# Patient Record
Sex: MALE | Race: WHITE | NOT HISPANIC OR LATINO | Employment: OTHER | URBAN - METROPOLITAN AREA
[De-identification: names, ages, dates, MRNs, and addresses within clinical notes are randomized per-mention and may not be internally consistent; named-entity substitution may affect disease eponyms.]

---

## 2017-01-16 ENCOUNTER — TRANSCRIBE ORDERS (OUTPATIENT)
Dept: ADMINISTRATIVE | Facility: HOSPITAL | Age: 82
End: 2017-01-16

## 2017-01-16 DIAGNOSIS — I73.9 PVD (PERIPHERAL VASCULAR DISEASE) (HCC): Primary | ICD-10-CM

## 2017-01-17 ENCOUNTER — HOSPITAL ENCOUNTER (OUTPATIENT)
Dept: CARDIOLOGY | Facility: HOSPITAL | Age: 82
Discharge: HOME | End: 2017-01-17
Admitting: PODIATRIST

## 2017-01-17 DIAGNOSIS — I73.9 PVD (PERIPHERAL VASCULAR DISEASE) (HCC): ICD-10-CM

## 2017-01-17 LAB
BH CV LOWER ARTERIAL LEFT GREAT TOE SYS MAX: 86 MMHG
BH CV LOWER ARTERIAL LEFT TBI RATIO: 0.61
BH CV LOWER ARTERIAL RIGHT ABI RATIO: 1.09
BH CV LOWER ARTERIAL RIGHT GREAT TOE SYS MAX: 85 MMHG
BH CV LOWER ARTERIAL RIGHT POST TIBIAL SYS MAX: 153 MMHG
BH CV LOWER ARTERIAL RIGHT TBI RATIO: 0.61
UPPER ARTERIAL LEFT ARM BRACHIAL SYS MAX: 140 MMHG
UPPER ARTERIAL RIGHT ARM BRACHIAL SYS MAX: 138 MMHG

## 2017-01-17 PROCEDURE — 93922 UPR/L XTREMITY ART 2 LEVELS: CPT

## 2017-02-16 ENCOUNTER — TRANSCRIBE ORDERS (OUTPATIENT)
Dept: PHYSICAL THERAPY | Facility: HOSPITAL | Age: 82
End: 2017-02-16

## 2017-02-16 DIAGNOSIS — M67.911 BILATERAL ROTATOR CUFF DYSFUNCTION: Primary | ICD-10-CM

## 2017-02-16 DIAGNOSIS — M67.912 BILATERAL ROTATOR CUFF DYSFUNCTION: Primary | ICD-10-CM

## 2017-03-16 ENCOUNTER — APPOINTMENT (OUTPATIENT)
Dept: PHYSICAL THERAPY | Facility: HOSPITAL | Age: 82
End: 2017-03-16
Attending: ORTHOPAEDIC SURGERY

## 2017-03-23 ENCOUNTER — APPOINTMENT (OUTPATIENT)
Dept: PHYSICAL THERAPY | Facility: HOSPITAL | Age: 82
End: 2017-03-23

## 2017-03-30 ENCOUNTER — APPOINTMENT (OUTPATIENT)
Dept: PHYSICAL THERAPY | Facility: HOSPITAL | Age: 82
End: 2017-03-30

## 2017-04-06 ENCOUNTER — APPOINTMENT (OUTPATIENT)
Dept: PHYSICAL THERAPY | Facility: HOSPITAL | Age: 82
End: 2017-04-06

## 2017-04-13 ENCOUNTER — APPOINTMENT (OUTPATIENT)
Dept: PHYSICAL THERAPY | Facility: HOSPITAL | Age: 82
End: 2017-04-13

## 2017-04-20 ENCOUNTER — APPOINTMENT (OUTPATIENT)
Dept: PHYSICAL THERAPY | Facility: HOSPITAL | Age: 82
End: 2017-04-20

## 2017-07-30 ENCOUNTER — HOSPITAL ENCOUNTER (EMERGENCY)
Facility: HOSPITAL | Age: 82
Discharge: HOME | End: 2017-07-30
Attending: EMERGENCY MEDICINE | Admitting: EMERGENCY MEDICINE

## 2017-07-30 ENCOUNTER — APPOINTMENT (OUTPATIENT)
Dept: GENERAL RADIOLOGY | Facility: HOSPITAL | Age: 82
End: 2017-07-30

## 2017-07-30 ENCOUNTER — APPOINTMENT (OUTPATIENT)
Dept: CT IMAGING | Facility: HOSPITAL | Age: 82
End: 2017-07-30

## 2017-07-30 VITALS
HEIGHT: 68 IN | HEART RATE: 75 BPM | SYSTOLIC BLOOD PRESSURE: 125 MMHG | RESPIRATION RATE: 18 BRPM | TEMPERATURE: 98.7 F | WEIGHT: 240 LBS | BODY MASS INDEX: 36.37 KG/M2 | OXYGEN SATURATION: 97 % | DIASTOLIC BLOOD PRESSURE: 82 MMHG

## 2017-07-30 DIAGNOSIS — S00.83XA CONTUSION OF FACE, INITIAL ENCOUNTER: Primary | ICD-10-CM

## 2017-07-30 DIAGNOSIS — Z79.01 CHRONIC ANTICOAGULATION: ICD-10-CM
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
## 2017-07-30 LAB
INR PPP: 2.26 (ref 0.9–1.1)
PROTHROMBIN TIME: 24.2 SECONDS (ref 11.7–14.2)

## 2017-07-30 PROCEDURE — 73502 X-RAY EXAM HIP UNI 2-3 VIEWS: CPT

## 2017-07-30 PROCEDURE — 72072 X-RAY EXAM THORAC SPINE 3VWS: CPT

## 2017-07-30 PROCEDURE — 85610 PROTHROMBIN TIME: CPT | Performed by: PHYSICIAN ASSISTANT

## 2017-07-30 PROCEDURE — 70450 CT HEAD/BRAIN W/O DYE: CPT

## 2017-07-30 PROCEDURE — 99283 EMERGENCY DEPT VISIT LOW MDM: CPT

## 2017-07-30 PROCEDURE — 72125 CT NECK SPINE W/O DYE: CPT

## 2017-07-30 RX ORDER — TAMSULOSIN HYDROCHLORIDE 0.4 MG/1
1 CAPSULE ORAL NIGHTLY
COMMUNITY

## 2017-07-30 RX ORDER — ATORVASTATIN CALCIUM 20 MG/1
20 TABLET, FILM COATED ORAL DAILY
COMMUNITY
End: 2022-08-18 | Stop reason: HOSPADM

## 2017-07-30 RX ORDER — DONEPEZIL HYDROCHLORIDE 10 MG/1
10 TABLET, FILM COATED ORAL NIGHTLY
COMMUNITY
End: 2022-08-18 | Stop reason: HOSPADM

## 2017-07-30 RX ORDER — UBIDECARENONE 75 MG
50 CAPSULE ORAL DAILY
COMMUNITY
End: 2022-08-15

## 2017-07-30 RX ORDER — METHYLPHENIDATE HYDROCHLORIDE 10 MG/1
30 TABLET ORAL 2 TIMES DAILY
COMMUNITY

## 2017-07-30 RX ORDER — FLUTICASONE PROPIONATE 50 MCG
2 SPRAY, SUSPENSION (ML) NASAL DAILY
Status: ON HOLD | COMMUNITY
End: 2022-08-15 | Stop reason: ALTCHOICE

## 2017-07-30 RX ORDER — CELECOXIB 200 MG/1
200 CAPSULE ORAL DAILY
COMMUNITY
End: 2022-08-15

## 2017-07-30 RX ORDER — ACETAMINOPHEN 500 MG
500 TABLET ORAL EVERY 4 HOURS PRN
COMMUNITY

## 2017-07-30 RX ORDER — CITALOPRAM 40 MG/1
20 TABLET ORAL DAILY
COMMUNITY
End: 2022-08-15

## 2017-07-30 RX ORDER — MEMANTINE HYDROCHLORIDE 10 MG/1
10 TABLET ORAL 2 TIMES DAILY
COMMUNITY
End: 2022-08-18 | Stop reason: HOSPADM

## 2017-07-30 RX ORDER — FUROSEMIDE 20 MG/1
40 TABLET ORAL DAILY
COMMUNITY

## 2017-07-30 RX ORDER — OMEPRAZOLE 20 MG/1
20 CAPSULE, DELAYED RELEASE ORAL DAILY
COMMUNITY

## 2017-07-30 RX ORDER — SOLIFENACIN SUCCINATE 10 MG/1
10 TABLET, FILM COATED ORAL DAILY
COMMUNITY
End: 2022-08-15

## 2017-07-30 RX ORDER — ATENOLOL 25 MG/1
25 TABLET ORAL DAILY
COMMUNITY
End: 2022-08-18 | Stop reason: HOSPADM

## 2017-07-30 RX ORDER — ASCORBIC ACID 500 MG
1000 TABLET ORAL DAILY
COMMUNITY
End: 2022-08-15

## 2017-07-30 RX ORDER — WARFARIN SODIUM 2.5 MG/1
2.5 TABLET ORAL SEE ADMIN INSTRUCTIONS
COMMUNITY
End: 2022-08-15

## 2022-08-15 ENCOUNTER — HOSPITAL ENCOUNTER (INPATIENT)
Facility: HOSPITAL | Age: 87
LOS: 3 days | Discharge: HOSPICE | End: 2022-08-18
Attending: EMERGENCY MEDICINE | Admitting: INTERNAL MEDICINE

## 2022-08-15 ENCOUNTER — APPOINTMENT (OUTPATIENT)
Dept: GENERAL RADIOLOGY | Facility: HOSPITAL | Age: 87
End: 2022-08-15

## 2022-08-15 DIAGNOSIS — I50.41 ACUTE COMBINED SYSTOLIC AND DIASTOLIC CONGESTIVE HEART FAILURE: ICD-10-CM

## 2022-08-15 DIAGNOSIS — G30.9 ALZHEIMER'S DEMENTIA WITH BEHAVIORAL DISTURBANCE, UNSPECIFIED TIMING OF DEMENTIA ONSET: ICD-10-CM

## 2022-08-15 DIAGNOSIS — J96.01 ACUTE RESPIRATORY FAILURE WITH HYPOXIA: Primary | ICD-10-CM

## 2022-08-15 DIAGNOSIS — J69.0 ASPIRATION PNEUMONIA, UNSPECIFIED ASPIRATION PNEUMONIA TYPE, UNSPECIFIED LATERALITY, UNSPECIFIED PART OF LUNG: ICD-10-CM

## 2022-08-15 DIAGNOSIS — F02.81 ALZHEIMER'S DEMENTIA WITH BEHAVIORAL DISTURBANCE, UNSPECIFIED TIMING OF DEMENTIA ONSET: ICD-10-CM

## 2022-08-15 PROBLEM — I50.9 CHF (CONGESTIVE HEART FAILURE) (HCC): Status: ACTIVE | Noted: 2022-08-15

## 2022-08-15 LAB
ALBUMIN SERPL-MCNC: 3.5 G/DL (ref 3.5–5.2)
ALBUMIN/GLOB SERPL: 0.9 G/DL
ALP SERPL-CCNC: 128 U/L (ref 39–117)
ALT SERPL W P-5'-P-CCNC: 159 U/L (ref 1–41)
ANION GAP SERPL CALCULATED.3IONS-SCNC: 16.4 MMOL/L (ref 5–15)
ARTERIAL PATENCY WRIST A: POSITIVE
AST SERPL-CCNC: 259 U/L (ref 1–40)
ATMOSPHERIC PRESS: 749 MMHG
BASE EXCESS BLDA CALC-SCNC: 1.6 MMOL/L (ref 0–2)
BASOPHILS # BLD AUTO: 0.07 10*3/MM3 (ref 0–0.2)
BASOPHILS NFR BLD AUTO: 0.9 % (ref 0–1.5)
BDY SITE: NORMAL
BILIRUB SERPL-MCNC: 1.3 MG/DL (ref 0–1.2)
BUN SERPL-MCNC: 27 MG/DL (ref 8–23)
BUN/CREAT SERPL: 15 (ref 7–25)
CALCIUM SPEC-SCNC: 8.5 MG/DL (ref 8.2–9.6)
CHLORIDE SERPL-SCNC: 102 MMOL/L (ref 98–107)
CO2 SERPL-SCNC: 22.6 MMOL/L (ref 22–29)
CREAT SERPL-MCNC: 1.8 MG/DL (ref 0.76–1.27)
D-LACTATE SERPL-SCNC: 3 MMOL/L (ref 0.5–2)
D-LACTATE SERPL-SCNC: 3.2 MMOL/L (ref 0.5–2)
D-LACTATE SERPL-SCNC: 4.4 MMOL/L (ref 0.5–2)
DEPRECATED RDW RBC AUTO: 76.2 FL (ref 37–54)
EGFRCR SERPLBLD CKD-EPI 2021: 35.1 ML/MIN/1.73
EOSINOPHIL # BLD AUTO: 0.01 10*3/MM3 (ref 0–0.4)
EOSINOPHIL NFR BLD AUTO: 0.1 % (ref 0.3–6.2)
ERYTHROCYTE [DISTWIDTH] IN BLOOD BY AUTOMATED COUNT: 21.8 % (ref 12.3–15.4)
GAS FLOW AIRWAY: 12 LPM
GLOBULIN UR ELPH-MCNC: 3.9 GM/DL
GLUCOSE SERPL-MCNC: 158 MG/DL (ref 65–99)
HCO3 BLDA-SCNC: 26 MMOL/L (ref 22–28)
HCT VFR BLD AUTO: 25 % (ref 37.5–51)
HGB BLD-MCNC: 7.7 G/DL (ref 13–17.7)
IMM GRANULOCYTES # BLD AUTO: 0.04 10*3/MM3 (ref 0–0.05)
IMM GRANULOCYTES NFR BLD AUTO: 0.5 % (ref 0–0.5)
INR PPP: 2.26 (ref 0.9–1.1)
LYMPHOCYTES # BLD AUTO: 1.47 10*3/MM3 (ref 0.7–3.1)
LYMPHOCYTES NFR BLD AUTO: 18.2 % (ref 19.6–45.3)
MCH RBC QN AUTO: 29.6 PG (ref 26.6–33)
MCHC RBC AUTO-ENTMCNC: 30.8 G/DL (ref 31.5–35.7)
MCV RBC AUTO: 96.2 FL (ref 79–97)
MODALITY: NORMAL
MONOCYTES # BLD AUTO: 1.02 10*3/MM3 (ref 0.1–0.9)
MONOCYTES NFR BLD AUTO: 12.6 % (ref 5–12)
NEUTROPHILS NFR BLD AUTO: 5.48 10*3/MM3 (ref 1.7–7)
NEUTROPHILS NFR BLD AUTO: 67.7 % (ref 42.7–76)
NRBC BLD AUTO-RTO: 0.6 /100 WBC (ref 0–0.2)
NT-PROBNP SERPL-MCNC: 6753 PG/ML (ref 0–1800)
PCO2 BLDA: 39.1 MM HG (ref 35–45)
PH BLDA: 7.43 PH UNITS (ref 7.35–7.45)
PLATELET # BLD AUTO: 45 10*3/MM3 (ref 140–450)
PO2 BLDA: 96 MM HG (ref 80–100)
POTASSIUM SERPL-SCNC: 6 MMOL/L (ref 3.5–5.2)
PROCALCITONIN SERPL-MCNC: 0.15 NG/ML (ref 0–0.25)
PROT SERPL-MCNC: 7.4 G/DL (ref 6–8.5)
PROTHROMBIN TIME: 24.4 SECONDS (ref 11.7–14.2)
QT INTERVAL: 367 MS
RBC # BLD AUTO: 2.6 10*6/MM3 (ref 4.14–5.8)
SAO2 % BLDCOA: 97.7 % (ref 92–99)
SODIUM SERPL-SCNC: 141 MMOL/L (ref 136–145)
TOTAL RATE: 36 BREATHS/MINUTE
TROPONIN T SERPL-MCNC: 0.46 NG/ML (ref 0–0.03)
WBC NRBC COR # BLD: 8.09 10*3/MM3 (ref 3.4–10.8)

## 2022-08-15 PROCEDURE — 93005 ELECTROCARDIOGRAM TRACING: CPT | Performed by: EMERGENCY MEDICINE

## 2022-08-15 PROCEDURE — 85610 PROTHROMBIN TIME: CPT | Performed by: EMERGENCY MEDICINE

## 2022-08-15 PROCEDURE — 80053 COMPREHEN METABOLIC PANEL: CPT | Performed by: EMERGENCY MEDICINE

## 2022-08-15 PROCEDURE — 86900 BLOOD TYPING SEROLOGIC ABO: CPT

## 2022-08-15 PROCEDURE — 84484 ASSAY OF TROPONIN QUANT: CPT | Performed by: EMERGENCY MEDICINE

## 2022-08-15 PROCEDURE — 99284 EMERGENCY DEPT VISIT MOD MDM: CPT

## 2022-08-15 PROCEDURE — 36415 COLL VENOUS BLD VENIPUNCTURE: CPT | Performed by: EMERGENCY MEDICINE

## 2022-08-15 PROCEDURE — 85025 COMPLETE CBC W/AUTO DIFF WBC: CPT | Performed by: EMERGENCY MEDICINE

## 2022-08-15 PROCEDURE — 87150 DNA/RNA AMPLIFIED PROBE: CPT | Performed by: EMERGENCY MEDICINE

## 2022-08-15 PROCEDURE — 87147 CULTURE TYPE IMMUNOLOGIC: CPT | Performed by: EMERGENCY MEDICINE

## 2022-08-15 PROCEDURE — 93010 ELECTROCARDIOGRAM REPORT: CPT | Performed by: INTERNAL MEDICINE

## 2022-08-15 PROCEDURE — 25010000002 FUROSEMIDE PER 20 MG: Performed by: EMERGENCY MEDICINE

## 2022-08-15 PROCEDURE — 94799 UNLISTED PULMONARY SVC/PX: CPT

## 2022-08-15 PROCEDURE — 84145 PROCALCITONIN (PCT): CPT | Performed by: EMERGENCY MEDICINE

## 2022-08-15 PROCEDURE — 82803 BLOOD GASES ANY COMBINATION: CPT

## 2022-08-15 PROCEDURE — 83605 ASSAY OF LACTIC ACID: CPT | Performed by: EMERGENCY MEDICINE

## 2022-08-15 PROCEDURE — 71045 X-RAY EXAM CHEST 1 VIEW: CPT

## 2022-08-15 PROCEDURE — 94761 N-INVAS EAR/PLS OXIMETRY MLT: CPT

## 2022-08-15 PROCEDURE — 83880 ASSAY OF NATRIURETIC PEPTIDE: CPT | Performed by: EMERGENCY MEDICINE

## 2022-08-15 PROCEDURE — 25010000002 PIPERACILLIN SOD-TAZOBACTAM PER 1 G: Performed by: EMERGENCY MEDICINE

## 2022-08-15 PROCEDURE — 94664 DEMO&/EVAL PT USE INHALER: CPT

## 2022-08-15 PROCEDURE — U0004 COV-19 TEST NON-CDC HGH THRU: HCPCS | Performed by: EMERGENCY MEDICINE

## 2022-08-15 PROCEDURE — 86901 BLOOD TYPING SEROLOGIC RH(D): CPT

## 2022-08-15 PROCEDURE — 94640 AIRWAY INHALATION TREATMENT: CPT

## 2022-08-15 PROCEDURE — 36600 WITHDRAWAL OF ARTERIAL BLOOD: CPT

## 2022-08-15 PROCEDURE — 87040 BLOOD CULTURE FOR BACTERIA: CPT | Performed by: EMERGENCY MEDICINE

## 2022-08-15 RX ORDER — NICOTINE POLACRILEX 4 MG
15 LOZENGE BUCCAL
Status: DISCONTINUED | OUTPATIENT
Start: 2022-08-15 | End: 2022-08-18 | Stop reason: HOSPADM

## 2022-08-15 RX ORDER — FUROSEMIDE 10 MG/ML
40 INJECTION INTRAMUSCULAR; INTRAVENOUS ONCE
Status: COMPLETED | OUTPATIENT
Start: 2022-08-15 | End: 2022-08-15

## 2022-08-15 RX ORDER — DIVALPROEX SODIUM 125 MG/1
125 CAPSULE, COATED PELLETS ORAL DAILY
COMMUNITY

## 2022-08-15 RX ORDER — RISPERIDONE 0.5 MG/1
0.5 TABLET ORAL 2 TIMES DAILY
COMMUNITY

## 2022-08-15 RX ORDER — SODIUM CHLORIDE 0.9 % (FLUSH) 0.9 %
10 SYRINGE (ML) INJECTION AS NEEDED
Status: DISCONTINUED | OUTPATIENT
Start: 2022-08-15 | End: 2022-08-18 | Stop reason: HOSPADM

## 2022-08-15 RX ORDER — SODIUM CHLORIDE 0.9 % (FLUSH) 0.9 %
10 SYRINGE (ML) INJECTION EVERY 12 HOURS SCHEDULED
Status: DISCONTINUED | OUTPATIENT
Start: 2022-08-15 | End: 2022-08-18 | Stop reason: HOSPADM

## 2022-08-15 RX ORDER — ESCITALOPRAM OXALATE 10 MG/1
10 TABLET ORAL DAILY
COMMUNITY

## 2022-08-15 RX ORDER — FUROSEMIDE 20 MG/1
20 TABLET ORAL DAILY
Status: ON HOLD | COMMUNITY
End: 2022-08-15 | Stop reason: ALTCHOICE

## 2022-08-15 RX ORDER — IPRATROPIUM BROMIDE AND ALBUTEROL SULFATE 2.5; .5 MG/3ML; MG/3ML
3 SOLUTION RESPIRATORY (INHALATION) ONCE
Status: COMPLETED | OUTPATIENT
Start: 2022-08-15 | End: 2022-08-15

## 2022-08-15 RX ORDER — POTASSIUM CHLORIDE 1500 MG/1
20 TABLET, FILM COATED, EXTENDED RELEASE ORAL DAILY
COMMUNITY

## 2022-08-15 RX ORDER — DEXTROSE MONOHYDRATE 25 G/50ML
25 INJECTION, SOLUTION INTRAVENOUS
Status: DISCONTINUED | OUTPATIENT
Start: 2022-08-15 | End: 2022-08-18 | Stop reason: HOSPADM

## 2022-08-15 RX ORDER — ALBUTEROL SULFATE 2.5 MG/3ML
2.5 SOLUTION RESPIRATORY (INHALATION)
Status: COMPLETED | OUTPATIENT
Start: 2022-08-15 | End: 2022-08-15

## 2022-08-15 RX ORDER — NITROGLYCERIN 0.4 MG/1
0.4 TABLET SUBLINGUAL
Status: DISCONTINUED | OUTPATIENT
Start: 2022-08-15 | End: 2022-08-18 | Stop reason: HOSPADM

## 2022-08-15 RX ADMIN — TAZOBACTAM SODIUM AND PIPERACILLIN SODIUM 3.38 G: 375; 3 INJECTION, SOLUTION INTRAVENOUS at 18:00

## 2022-08-15 RX ADMIN — FUROSEMIDE 40 MG: 10 INJECTION, SOLUTION INTRAMUSCULAR; INTRAVENOUS at 20:59

## 2022-08-15 RX ADMIN — Medication 10 ML: at 20:59

## 2022-08-15 RX ADMIN — ALBUTEROL SULFATE 2.5 MG: 2.5 SOLUTION RESPIRATORY (INHALATION) at 17:35

## 2022-08-15 RX ADMIN — TAZOBACTAM SODIUM AND PIPERACILLIN SODIUM 3.38 G: 375; 3 INJECTION, SOLUTION INTRAVENOUS at 23:36

## 2022-08-15 RX ADMIN — Medication 10 ML: at 23:37

## 2022-08-15 RX ADMIN — IPRATROPIUM BROMIDE AND ALBUTEROL SULFATE 3 ML: .5; 3 SOLUTION RESPIRATORY (INHALATION) at 17:48

## 2022-08-15 RX ADMIN — ALBUTEROL SULFATE 2.5 MG: 2.5 SOLUTION RESPIRATORY (INHALATION) at 17:45

## 2022-08-15 NOTE — PROGRESS NOTES
Clinical Pharmacy Services: Medication History    Alton Mayes is a 89 y.o. male presenting to [facility] for   Chief Complaint   Patient presents with   • Shortness of Breath       He  has a past medical history of Narcolepsy.    Allergies as of 08/15/2022 - Reviewed 08/15/2022   Allergen Reaction Noted   • Streptomycin  07/30/2017       Medication information was obtained from: snf Paperwork  Pharmacy and Phone Number:     Prior to Admission Medications     Prescriptions Last Dose Informant Patient Reported? Taking?    acetaminophen (TYLENOL) 500 MG tablet  Nursing Home Yes Yes    Take 500 mg by mouth Every 4 (Four) Hours As Needed for Mild Pain (1-3).    apixaban (ELIQUIS) 5 MG tablet tablet  Nursing Home Yes Yes    Take 5 mg by mouth 2 (Two) Times a Day.    ARTIFICIAL SALIVA MT  Nursing Home Yes Yes    Apply 3 sprays to the mouth or throat 6 (Six) Times a Day. As Needed    atenolol (TENORMIN) 25 MG tablet  Nursing Home Yes Yes    Take 25 mg by mouth Daily.    atorvastatin (LIPITOR) 20 MG tablet  Nursing Home Yes Yes    Take 20 mg by mouth Daily.    Cholecalciferol 1000 UNITS capsule  Nursing Home Yes Yes    Take 1,000 Units by mouth Daily.    Divalproex Sodium (DEPAKOTE SPRINKLE) 125 MG capsule  Nursing Home Yes Yes    Take 125 mg by mouth Daily.    donepezil (ARICEPT) 10 MG tablet  Nursing Home Yes Yes    Take 10 mg by mouth Every Night.    escitalopram (Lexapro) 10 MG tablet  Nursing Home Yes Yes    Take 10 mg by mouth Daily.    fluticasone (FLONASE) 50 MCG/ACT nasal spray  Nursing Home Yes Yes    2 sprays into the nostril(s) as directed by provider Daily.    furosemide (LASIX) 20 MG tablet  Nursing Home Yes Yes    Take 40 mg by mouth Daily. For Hypertension    furosemide (LASIX) 20 MG tablet  Nursing Home Yes Yes    Take 20 mg by mouth Daily. For Edema    memantine (NAMENDA) 10 MG tablet  Nursing Home Yes Yes    Take 10 mg by mouth 2 (Two) Times a Day.    methylphenidate (RITALIN) 10  MG tablet  Nursing Home Yes Yes    Take 30 mg by mouth 2 (Two) Times a Day. For Narcolepsy    omeprazole (priLOSEC) 20 MG capsule  Nursing Home Yes Yes    Take 20 mg by mouth Daily.    potassium chloride ER (K-TAB) 20 MEQ tablet controlled-release ER tablet  Nursing Home Yes Yes    Take 20 mEq by mouth Daily.    protriptyline (VIVACTIL) 10 MG tablet  Nursing Home Yes Yes    Take 10 mg by mouth 2 (Two) Times a Day.    risperiDONE (risperDAL) 0.5 MG tablet  Nursing Home Yes Yes    Take 0.5 mg by mouth 2 (Two) Times a Day.    tamsulosin (FLOMAX) 0.4 MG capsule 24 hr capsule  Nursing Home Yes Yes    Take 1 capsule by mouth Every Night.            Medication notes:     This medication list is complete to the best of my knowledge as of 8/15/2022    Please call if questions.    Gamaliel Tilley  Medication History Technician   [phone]    8/15/2022 19:54 EDT

## 2022-08-15 NOTE — ED NOTES
Pt to [facility] for SOB x 2 weeks, pt having severe cough, sats to high 80's after lunch. On 10 L NRB on arrival

## 2022-08-15 NOTE — ED PROVIDER NOTES
EMERGENCY DEPARTMENT ENCOUNTER    Room Number:  27/27  Date of encounter:  8/15/2022  PCP: Jin Cain MD  Historian: EMS      HPI:  Chief Complaint: Dyspnea, low oxygen saturation,?  Aspiration  A complete HPI/ROS/PMH/PSH/SH/FH are unobtainable due to: Underlying dementia    Context: Alton Mayes is a 89 y.o. male who presents to the ED from [facility] with increasing shortness of breath.  EMS reports that after lunch, the patient's respiratory status progressively declined.  He is currently on a 10 L nonrebreather mask with oxygen saturation in the upper 80s.  He is severely demented and hard of hearing and cannot give any history.      Summary of prior records: No recent ER visits/hospitalizations.    The patient was placed in a mask in triage, hand hygiene was performed before and after my interaction with the patient.  I wore a mask, safety glasses and gloves during my entire interaction with the patient.    PAST MEDICAL HISTORY  Active Ambulatory Problems     Diagnosis Date Noted   • No Active Ambulatory Problems     Resolved Ambulatory Problems     Diagnosis Date Noted   • No Resolved Ambulatory Problems     Past Medical History:   Diagnosis Date   • Narcolepsy          PAST SURGICAL HISTORY  Past Surgical History:   Procedure Laterality Date   • AORTA SURGERY  2001   • KNEE SURGERY Bilateral          FAMILY HISTORY  No family history on file.      SOCIAL HISTORY  Social History     Socioeconomic History   • Marital status:    Tobacco Use   • Smoking status: Former Smoker   Substance and Sexual Activity   • Alcohol use: Yes   • Drug use: No         ALLERGIES  Streptomycin        REVIEW OF SYSTEMS  Review of Systems   Unable to perform ROS: Dementia         PHYSICAL EXAM    I have reviewed the triage vital signs and nursing notes.    ED Triage Vitals [08/15/22 1602]   Temp Heart Rate Resp BP SpO2   97 °F (36.1 °C) 89 24 111/67 (!) 88 %      Temp src Heart Rate Source Patient  Position BP Location FiO2 (%)   Tympanic -- -- -- --       Physical Exam   Constitutional: Pt. is lying in bed with his eyes closed.  He is dyspneic and in moderate respiratory distress.  He will open his eyes and look at me when I call his name.  He does not follow commands.  HENT: Normocephalic and atraumatic.   Neck: Normal range of motion. Neck supple. No JVD present.   Cardiovascular: Irregularly irregular rate, regular rhythm and normal heart sounds. No murmur heard.  Pulmonary/Chest: Increased work of breathing with prolonged expirations.  There are coarse diffuse rhonchi.    Abdominal: Soft, obese. There is no tenderness. There is no rebound and no guarding.   Musculoskeletal: Minimal lower extremity edema, no tenderness or deformity.   Neurological: Pt. is lethargic.  He opens his eyes when I call his name. Skin: Skin is warm and dry. No rash noted. Pt. is not diaphoretic. No erythema.   Psychiatric: Unable to assess.      Nursing note and vitals reviewed.        LAB RESULTS  Recent Results (from the past 24 hour(s))   ECG 12 Lead    Collection Time: 08/15/22  5:20 PM   Result Value Ref Range    QT Interval 367 ms   Blood Gas, Arterial -    Collection Time: 08/15/22  5:39 PM    Specimen: Arterial Blood   Result Value Ref Range    Site Arterial: right radial     Alton's Test Positive     pH, Arterial 7.431 7.350 - 7.450 pH units    pCO2, Arterial 39.1 35.0 - 45.0 mm Hg    pO2, Arterial 96.0 80.0 - 100.0 mm Hg    HCO3, Arterial 26.0 22.0 - 28.0 mmol/L    Base Excess, Arterial 1.6 0.0 - 2.0 mmol/L    O2 Saturation Calculated 97.7 92.0 - 99.0 %    Barometric Pressure for Blood Gas 749.0 mmHg    Modality NRB     Flow Rate 12 lpm    Rate 36 Breaths/minute   Troponin    Collection Time: 08/15/22  5:57 PM    Specimen: Blood   Result Value Ref Range    Troponin T 0.464 (C) 0.000 - 0.030 ng/mL   BNP    Collection Time: 08/15/22  5:57 PM    Specimen: Blood   Result Value Ref Range    proBNP 6,753.0 (H) 0.0 - 1,800.0  pg/mL   Procalcitonin    Collection Time: 08/15/22  5:57 PM    Specimen: Blood   Result Value Ref Range    Procalcitonin 0.15 0.00 - 0.25 ng/mL   Lactic Acid, Plasma    Collection Time: 08/15/22  5:57 PM    Specimen: Blood   Result Value Ref Range    Lactate 4.4 (C) 0.5 - 2.0 mmol/L   Comprehensive Metabolic Panel    Collection Time: 08/15/22  5:57 PM    Specimen: Blood   Result Value Ref Range    Glucose 158 (H) 65 - 99 mg/dL    BUN 27 (H) 8 - 23 mg/dL    Creatinine 1.80 (H) 0.76 - 1.27 mg/dL    Sodium 141 136 - 145 mmol/L    Potassium 6.0 (H) 3.5 - 5.2 mmol/L    Chloride 102 98 - 107 mmol/L    CO2 22.6 22.0 - 29.0 mmol/L    Calcium 8.5 8.2 - 9.6 mg/dL    Total Protein 7.4 6.0 - 8.5 g/dL    Albumin 3.50 3.50 - 5.20 g/dL    ALT (SGPT) 159 (H) 1 - 41 U/L    AST (SGOT) 259 (H) 1 - 40 U/L    Alkaline Phosphatase 128 (H) 39 - 117 U/L    Total Bilirubin 1.3 (H) 0.0 - 1.2 mg/dL    Globulin 3.9 gm/dL    A/G Ratio 0.9 g/dL    BUN/Creatinine Ratio 15.0 7.0 - 25.0    Anion Gap 16.4 (H) 5.0 - 15.0 mmol/L    eGFR 35.1 (L) >60.0 mL/min/1.73   CBC Auto Differential    Collection Time: 08/15/22  5:57 PM    Specimen: Blood   Result Value Ref Range    WBC 8.09 3.40 - 10.80 10*3/mm3    RBC 2.60 (L) 4.14 - 5.80 10*6/mm3    Hemoglobin 7.7 (L) 13.0 - 17.7 g/dL    Hematocrit 25.0 (L) 37.5 - 51.0 %    MCV 96.2 79.0 - 97.0 fL    MCH 29.6 26.6 - 33.0 pg    MCHC 30.8 (L) 31.5 - 35.7 g/dL    RDW 21.8 (H) 12.3 - 15.4 %    RDW-SD 76.2 (H) 37.0 - 54.0 fl    Platelets 45 (C) 140 - 450 10*3/mm3    Neutrophil % 67.7 42.7 - 76.0 %    Lymphocyte % 18.2 (L) 19.6 - 45.3 %    Monocyte % 12.6 (H) 5.0 - 12.0 %    Eosinophil % 0.1 (L) 0.3 - 6.2 %    Basophil % 0.9 0.0 - 1.5 %    Immature Grans % 0.5 0.0 - 0.5 %    Neutrophils, Absolute 5.48 1.70 - 7.00 10*3/mm3    Lymphocytes, Absolute 1.47 0.70 - 3.10 10*3/mm3    Monocytes, Absolute 1.02 (H) 0.10 - 0.90 10*3/mm3    Eosinophils, Absolute 0.01 0.00 - 0.40 10*3/mm3    Basophils, Absolute 0.07 0.00 - 0.20  10*3/mm3    Immature Grans, Absolute 0.04 0.00 - 0.05 10*3/mm3    nRBC 0.6 (H) 0.0 - 0.2 /100 WBC       Ordered the above labs and independently reviewed the results.        RADIOLOGY  XR Chest 1 View    Result Date: 8/15/2022  XR CHEST 1 VW-clinical: Dyspnea  COMPARISON 6/6/2011  FINDINGS: Bilateral airspace disease demonstrated within the mid and lower lung zones, greater at the right base than the left. Some areas are nodular. Possible small bilateral pleural effusions. Possible vascular congestion with potential atypical pneumonia. The cardiomediastinal silhouette is stable, there is atherosclerotic calcification of the aorta. Borderline cardiac enlargement as before. Short-term follow-up PA and lateral view of the chest advised. The remainder is unremarkable.  This report was finalized on 8/15/2022 5:36 PM by Dr. Darnell Maza M.D.        I ordered the above noted radiological studies. Reviewed by me and discussed with radiologist.  See dictation for official radiology interpretation.      PROCEDURES    Procedures      MEDICATIONS GIVEN IN ER    Medications   sodium chloride 0.9 % flush 10 mL (has no administration in time range)   furosemide (LASIX) injection 40 mg (has no administration in time range)   albuterol (PROVENTIL) nebulizer solution 0.083% 2.5 mg/3mL (2.5 mg Nebulization Given 8/15/22 1745)   ipratropium-albuterol (DUO-NEB) nebulizer solution 3 mL (3 mL Nebulization Given 8/15/22 1748)   piperacillin-tazobactam (ZOSYN) 3.375 g in iso-osmotic dextrose 50 ml (premix) (3.375 g Intravenous New Bag 8/15/22 1800)         PROGRESS, DATA ANALYSIS, CONSULTS, AND MEDICAL DECISION MAKING    Any/all labs have been independently reviewed by me.  Any/all radiology studies have been reviewed by me and discussed with radiologist dictating the report.   EKG's independently viewed and interpreted by me.  Discussion below represents my analysis of pertinent findings related to patient's condition, differential  diagnosis, treatment plan and final disposition.    Number of Diagnoses or Management Options     Amount and/or Complexity of Data Reviewed  Clinical lab tests:  Yes  Tests in the radiology section of CPT®:  Yes  Tests in the medicine section of CPT®:  Yes  Review and summarize past medical records:  (Yes-see HPI)  Independent visualization of images, tracings, or specimens: (-See below)      ED Course as of 08/15/22 2007   Mon Aug 15, 2022   1622 Case discussed with patient's primary care physician, Dr. Jin Cain. [WC]   1742 Chest x-ray independently visualized by me and discussed with/interpreted by Dr. Maza (radiology)-there is bilateral airspace disease in the mid and lower lung zones, possible congestion versus atypical pneumonia.  For official interpretation, see dictated report. [WC]   1746 pH, Arterial: 7.431 [WC]   1746 pCO2, Arterial: 39.1 [WC]   1746 pO2, Arterial: 96.0 [WC]   1813 ABG is unremarkable.  He is resting comfortably on 8 L oxygen. [WC]   1838 proBNP(!): 6,753.0 [WC]   1838 Lactate(!!): 4.4 [WC]   1843 Procalcitonin: 0.15 [WC]   1901 WBC: 8.09 [WC]   1901 Hemoglobin(!): 7.7 [WC]   1901 Hematocrit(!): 25.0 [WC]   1901 Platelets(!!): 45 [WC]   1901 Awaiting the rest of his laboratory work-up. [WC]   1903 EKG performed at 1728 interpreted by me shows atrial fibrillation with an occasional PVC.  There is baseline wander in the lateral leads.  QRS complexes show nonspecific interventricular delay and ossific ST-T changes.  There are no prior EKGs available for comparison. [WC]   1913 Troponin T(!!): 0.464 [WC]   1913 BUN(!): 27 [WC]   1913 Creatinine(!): 1.80 [WC]   1913 Potassium(!): 6.0 [WC]   1913 ALT (SGPT)(!): 159 [WC]   1913 AST (SGOT)(!): 259 [WC]   1913 Alkaline Phosphatase(!): 128 [WC]   1913 Total Bilirubin(!): 1.3 [WC]   1917 Is discussed with the patient's primary care physician, Jin Cain.  He agrees to admit him to a telemetry bed.  Small dose of Lasix will be given as well.  [WC]      ED Course User Index  [WC] Boo Hernandez MD       AS OF 20:07 EDT VITALS:    BP - 111/67  HR - 93  TEMP - 97 °F (36.1 °C) (Tympanic)  02 SATS - 96%        DIAGNOSIS  Final diagnoses:   Acute respiratory failure with hypoxia (HCC)   Acute combined systolic and diastolic congestive heart failure (HCC)   Aspiration pneumonia, unspecified aspiration pneumonia type, unspecified laterality, unspecified part of lung (HCC)   Alzheimer's dementia with behavioral disturbance, unspecified timing of dementia onset (HCC)         DISPOSITION  Admitted-telemetry           Boo Hernandez MD  08/15/22 2008

## 2022-08-16 LAB
ABO GROUP BLD: NORMAL
ALBUMIN SERPL-MCNC: 3.3 G/DL (ref 3.5–5.2)
ALBUMIN/GLOB SERPL: 1 G/DL
ALP SERPL-CCNC: 116 U/L (ref 39–117)
ALT SERPL W P-5'-P-CCNC: 149 U/L (ref 1–41)
ANION GAP SERPL CALCULATED.3IONS-SCNC: 13 MMOL/L (ref 5–15)
AST SERPL-CCNC: 217 U/L (ref 1–40)
BACTERIA BLD CULT: ABNORMAL
BASOPHILS # BLD AUTO: 0.07 10*3/MM3 (ref 0–0.2)
BASOPHILS NFR BLD AUTO: 0.8 % (ref 0–1.5)
BILIRUB SERPL-MCNC: 1.4 MG/DL (ref 0–1.2)
BLD GP AB SCN SERPL QL: NEGATIVE
BOTTLE TYPE: ABNORMAL
BUN SERPL-MCNC: 28 MG/DL (ref 8–23)
BUN/CREAT SERPL: 17.7 (ref 7–25)
CALCIUM SPEC-SCNC: 8.7 MG/DL (ref 8.2–9.6)
CHLORIDE SERPL-SCNC: 104 MMOL/L (ref 98–107)
CO2 SERPL-SCNC: 27 MMOL/L (ref 22–29)
CREAT SERPL-MCNC: 1.58 MG/DL (ref 0.76–1.27)
D-LACTATE SERPL-SCNC: 1.6 MMOL/L (ref 0.5–2)
D-LACTATE SERPL-SCNC: 2.1 MMOL/L (ref 0.5–2)
D-LACTATE SERPL-SCNC: 2.2 MMOL/L (ref 0.5–2)
DEPRECATED RDW RBC AUTO: 66.2 FL (ref 37–54)
DEPRECATED RDW RBC AUTO: 73.7 FL (ref 37–54)
EGFRCR SERPLBLD CKD-EPI 2021: 41 ML/MIN/1.73
EOSINOPHIL # BLD AUTO: 0.04 10*3/MM3 (ref 0–0.4)
EOSINOPHIL NFR BLD AUTO: 0.5 % (ref 0.3–6.2)
ERYTHROCYTE [DISTWIDTH] IN BLOOD BY AUTOMATED COUNT: 20.6 % (ref 12.3–15.4)
ERYTHROCYTE [DISTWIDTH] IN BLOOD BY AUTOMATED COUNT: 21.9 % (ref 12.3–15.4)
GLOBULIN UR ELPH-MCNC: 3.2 GM/DL
GLUCOSE BLDC GLUCOMTR-MCNC: 158 MG/DL (ref 70–130)
GLUCOSE SERPL-MCNC: 150 MG/DL (ref 65–99)
HCT VFR BLD AUTO: 22.2 % (ref 37.5–51)
HCT VFR BLD AUTO: 28.4 % (ref 37.5–51)
HGB BLD-MCNC: 7.1 G/DL (ref 13–17.7)
HGB BLD-MCNC: 9.3 G/DL (ref 13–17.7)
IRON 24H UR-MRATE: 41 MCG/DL (ref 59–158)
LDH SERPL-CCNC: 453 U/L (ref 135–225)
LYMPHOCYTES # BLD AUTO: 1.69 10*3/MM3 (ref 0.7–3.1)
LYMPHOCYTES NFR BLD AUTO: 19.2 % (ref 19.6–45.3)
MCH RBC QN AUTO: 29.2 PG (ref 26.6–33)
MCH RBC QN AUTO: 30.2 PG (ref 26.6–33)
MCHC RBC AUTO-ENTMCNC: 32 G/DL (ref 31.5–35.7)
MCHC RBC AUTO-ENTMCNC: 32.7 G/DL (ref 31.5–35.7)
MCV RBC AUTO: 89.3 FL (ref 79–97)
MCV RBC AUTO: 94.5 FL (ref 79–97)
MONOCYTES # BLD AUTO: 0.86 10*3/MM3 (ref 0.1–0.9)
MONOCYTES NFR BLD AUTO: 9.8 % (ref 5–12)
NEUTROPHILS NFR BLD AUTO: 6.01 10*3/MM3 (ref 1.7–7)
NEUTROPHILS NFR BLD AUTO: 68.3 % (ref 42.7–76)
PLATELET # BLD AUTO: 36 10*3/MM3 (ref 140–450)
PLATELET # BLD AUTO: 41 10*3/MM3 (ref 140–450)
POTASSIUM SERPL-SCNC: 3.9 MMOL/L (ref 3.5–5.2)
PROT SERPL-MCNC: 6.5 G/DL (ref 6–8.5)
RBC # BLD AUTO: 2.35 10*6/MM3 (ref 4.14–5.8)
RBC # BLD AUTO: 3.18 10*6/MM3 (ref 4.14–5.8)
RH BLD: POSITIVE
SARS-COV-2 ORF1AB RESP QL NAA+PROBE: NOT DETECTED
SODIUM SERPL-SCNC: 144 MMOL/L (ref 136–145)
T&S EXPIRATION DATE: NORMAL
WBC NRBC COR # BLD: 8.04 10*3/MM3 (ref 3.4–10.8)
WBC NRBC COR # BLD: 8.79 10*3/MM3 (ref 3.4–10.8)

## 2022-08-16 PROCEDURE — 86901 BLOOD TYPING SEROLOGIC RH(D): CPT | Performed by: INTERNAL MEDICINE

## 2022-08-16 PROCEDURE — 92610 EVALUATE SWALLOWING FUNCTION: CPT

## 2022-08-16 PROCEDURE — 83605 ASSAY OF LACTIC ACID: CPT | Performed by: EMERGENCY MEDICINE

## 2022-08-16 PROCEDURE — 86923 COMPATIBILITY TEST ELECTRIC: CPT

## 2022-08-16 PROCEDURE — 83615 LACTATE (LD) (LDH) ENZYME: CPT | Performed by: INTERNAL MEDICINE

## 2022-08-16 PROCEDURE — 25010000002 PIPERACILLIN SOD-TAZOBACTAM PER 1 G: Performed by: INTERNAL MEDICINE

## 2022-08-16 PROCEDURE — 94799 UNLISTED PULMONARY SVC/PX: CPT

## 2022-08-16 PROCEDURE — 85027 COMPLETE CBC AUTOMATED: CPT | Performed by: INTERNAL MEDICINE

## 2022-08-16 PROCEDURE — 82962 GLUCOSE BLOOD TEST: CPT

## 2022-08-16 PROCEDURE — 83540 ASSAY OF IRON: CPT | Performed by: INTERNAL MEDICINE

## 2022-08-16 PROCEDURE — 86850 RBC ANTIBODY SCREEN: CPT | Performed by: INTERNAL MEDICINE

## 2022-08-16 PROCEDURE — 86900 BLOOD TYPING SEROLOGIC ABO: CPT

## 2022-08-16 PROCEDURE — 85025 COMPLETE CBC W/AUTO DIFF WBC: CPT | Performed by: INTERNAL MEDICINE

## 2022-08-16 PROCEDURE — P9016 RBC LEUKOCYTES REDUCED: HCPCS

## 2022-08-16 PROCEDURE — 80053 COMPREHEN METABOLIC PANEL: CPT | Performed by: INTERNAL MEDICINE

## 2022-08-16 PROCEDURE — 36430 TRANSFUSION BLD/BLD COMPNT: CPT

## 2022-08-16 PROCEDURE — 86900 BLOOD TYPING SEROLOGIC ABO: CPT | Performed by: INTERNAL MEDICINE

## 2022-08-16 RX ORDER — TAMSULOSIN HYDROCHLORIDE 0.4 MG/1
0.4 CAPSULE ORAL NIGHTLY
Status: DISCONTINUED | OUTPATIENT
Start: 2022-08-16 | End: 2022-08-18 | Stop reason: HOSPADM

## 2022-08-16 RX ORDER — FERROUS SULFATE 325(65) MG
325 TABLET ORAL
Status: DISCONTINUED | OUTPATIENT
Start: 2022-08-16 | End: 2022-08-18 | Stop reason: HOSPADM

## 2022-08-16 RX ORDER — PANTOPRAZOLE SODIUM 40 MG/1
40 TABLET, DELAYED RELEASE ORAL EVERY MORNING
Status: DISCONTINUED | OUTPATIENT
Start: 2022-08-16 | End: 2022-08-18 | Stop reason: HOSPADM

## 2022-08-16 RX ORDER — DONEPEZIL HYDROCHLORIDE 10 MG/1
10 TABLET, FILM COATED ORAL NIGHTLY
Status: DISCONTINUED | OUTPATIENT
Start: 2022-08-16 | End: 2022-08-17

## 2022-08-16 RX ORDER — ATORVASTATIN CALCIUM 20 MG/1
20 TABLET, FILM COATED ORAL DAILY
Status: DISCONTINUED | OUTPATIENT
Start: 2022-08-16 | End: 2022-08-17

## 2022-08-16 RX ORDER — ACETAMINOPHEN 500 MG
500 TABLET ORAL EVERY 4 HOURS PRN
Status: DISCONTINUED | OUTPATIENT
Start: 2022-08-16 | End: 2022-08-18 | Stop reason: HOSPADM

## 2022-08-16 RX ORDER — MEMANTINE HYDROCHLORIDE 10 MG/1
10 TABLET ORAL 2 TIMES DAILY
Status: DISCONTINUED | OUTPATIENT
Start: 2022-08-16 | End: 2022-08-17

## 2022-08-16 RX ORDER — ATENOLOL 25 MG/1
25 TABLET ORAL DAILY
Status: DISCONTINUED | OUTPATIENT
Start: 2022-08-16 | End: 2022-08-17

## 2022-08-16 RX ORDER — RISPERIDONE 0.5 MG/1
0.5 TABLET ORAL 2 TIMES DAILY
Status: DISCONTINUED | OUTPATIENT
Start: 2022-08-16 | End: 2022-08-18 | Stop reason: HOSPADM

## 2022-08-16 RX ORDER — PROTRIPTYLINE HYDROCHLORIDE 5 MG/1
10 TABLET, FILM COATED ORAL 2 TIMES DAILY
Status: DISCONTINUED | OUTPATIENT
Start: 2022-08-16 | End: 2022-08-17

## 2022-08-16 RX ORDER — ESCITALOPRAM OXALATE 10 MG/1
10 TABLET ORAL DAILY
Status: DISCONTINUED | OUTPATIENT
Start: 2022-08-16 | End: 2022-08-18 | Stop reason: HOSPADM

## 2022-08-16 RX ORDER — METHYLPHENIDATE HYDROCHLORIDE 5 MG/1
30 TABLET ORAL 2 TIMES DAILY
Status: DISCONTINUED | OUTPATIENT
Start: 2022-08-16 | End: 2022-08-18 | Stop reason: HOSPADM

## 2022-08-16 RX ORDER — MELATONIN
1000 DAILY
Status: DISCONTINUED | OUTPATIENT
Start: 2022-08-16 | End: 2022-08-18 | Stop reason: HOSPADM

## 2022-08-16 RX ORDER — POTASSIUM CHLORIDE 750 MG/1
20 TABLET, FILM COATED, EXTENDED RELEASE ORAL DAILY
Status: DISCONTINUED | OUTPATIENT
Start: 2022-08-16 | End: 2022-08-18 | Stop reason: HOSPADM

## 2022-08-16 RX ORDER — DIVALPROEX SODIUM 125 MG/1
125 CAPSULE, COATED PELLETS ORAL DAILY
Status: DISCONTINUED | OUTPATIENT
Start: 2022-08-16 | End: 2022-08-18 | Stop reason: HOSPADM

## 2022-08-16 RX ADMIN — TAZOBACTAM SODIUM AND PIPERACILLIN SODIUM 3.38 G: 375; 3 INJECTION, SOLUTION INTRAVENOUS at 08:56

## 2022-08-16 RX ADMIN — ATENOLOL 25 MG: 25 TABLET ORAL at 11:31

## 2022-08-16 RX ADMIN — ESCITALOPRAM 10 MG: 10 TABLET, FILM COATED ORAL at 11:31

## 2022-08-16 RX ADMIN — TAZOBACTAM SODIUM AND PIPERACILLIN SODIUM 3.38 G: 375; 3 INJECTION, SOLUTION INTRAVENOUS at 23:55

## 2022-08-16 RX ADMIN — RISPERIDONE 0.5 MG: 0.5 TABLET, FILM COATED ORAL at 11:31

## 2022-08-16 RX ADMIN — FERROUS SULFATE TAB 325 MG (65 MG ELEMENTAL FE) 325 MG: 325 (65 FE) TAB at 12:50

## 2022-08-16 RX ADMIN — POTASSIUM CHLORIDE 20 MEQ: 750 TABLET, EXTENDED RELEASE ORAL at 11:31

## 2022-08-16 RX ADMIN — RISPERIDONE 0.5 MG: 0.5 TABLET, FILM COATED ORAL at 21:11

## 2022-08-16 RX ADMIN — Medication 10 ML: at 08:56

## 2022-08-16 RX ADMIN — PANTOPRAZOLE SODIUM 40 MG: 40 TABLET, DELAYED RELEASE ORAL at 11:30

## 2022-08-16 RX ADMIN — DIVALPROEX SODIUM 125 MG: 125 CAPSULE, COATED PELLETS ORAL at 11:32

## 2022-08-16 RX ADMIN — METHYLPHENIDATE HYDROCHLORIDE 30 MG: 10 TABLET ORAL at 21:11

## 2022-08-16 RX ADMIN — TAZOBACTAM SODIUM AND PIPERACILLIN SODIUM 3.38 G: 375; 3 INJECTION, SOLUTION INTRAVENOUS at 15:51

## 2022-08-16 RX ADMIN — Medication 10 ML: at 21:12

## 2022-08-16 RX ADMIN — DONEPEZIL HYDROCHLORIDE 10 MG: 10 TABLET, FILM COATED ORAL at 21:12

## 2022-08-16 RX ADMIN — APIXABAN 5 MG: 5 TABLET, FILM COATED ORAL at 11:32

## 2022-08-16 RX ADMIN — METHYLPHENIDATE HYDROCHLORIDE 30 MG: 10 TABLET ORAL at 12:51

## 2022-08-16 RX ADMIN — TAMSULOSIN HYDROCHLORIDE 0.4 MG: 0.4 CAPSULE ORAL at 21:12

## 2022-08-16 RX ADMIN — MEMANTINE HYDROCHLORIDE 10 MG: 10 TABLET, FILM COATED ORAL at 11:31

## 2022-08-16 RX ADMIN — Medication 1000 UNITS: at 11:31

## 2022-08-16 RX ADMIN — MEMANTINE HYDROCHLORIDE 10 MG: 10 TABLET, FILM COATED ORAL at 21:12

## 2022-08-16 RX ADMIN — ATORVASTATIN CALCIUM 20 MG: 20 TABLET, FILM COATED ORAL at 11:31

## 2022-08-16 NOTE — H&P
History of Present Illness      Patient is a resident on the dementia unit at [facility].  Staff there noticed that he been having some mild shortness of breath symptoms over the last several weeks, along with persistent slowly worsening cough.  He has been afebrile, and comfortable otherwise.  This afternoon, after lunch, he was noted to have diminished oxygen saturations in the high 80s.  Overall he was comfortable, and did not appear to be in distress. O2 sats slowly decreased, despite being on rebreathing mask at 10 liters, and he was transported to the emergency room for further evaluation.    In the ER, he was comfortable on arrival, on 10 liters NRB. Given his baseline mental status, he was not able to give history.  His O2 sats were in the low 90s.  Chest x-ray showed questionable early pneumonia versus congestive heart failure.  He was started on Zosyn IV, and given IV Lasix 40 mg.  He was afebrile, white count was normal, but his lactate was elevated.  His admitted for further care.    This morning, he is resting comfortably in the bed.  He is alert but lethargic, he responds with mumbling to questions and stimulus.  Overnight, he was noted to have a dropping hemoglobin from 7.7 to 7.1 And was transfused 2 units packed red cells.  He had no obvious source of bleeding. His blood pressure, O2 sats, heart rate, and temperature remain stable through the night.    Current Medications   Taking   -Atenolol 25 MG Tablet 1 tablet Orally Twice a day    -Lasix 20 MG Tablet 1 tablet Orally Twice daily    -Potassium 20 meq Once daily.  -Atorvastatin Calcium 20 MG Tablet 1 tablet Orally Once a day    -Donepezil HCl 10 MG Tablet 1 tablet at bedtime Orally BID   -Namenda 10 MG Tablet 1 tablet Orally Twice a day    -Eliquis 5 mg 5 MG Tablet 1 tablet Twice a day    -Flomax 0.4 MG Capsule Extended Release 24 Hour 1 tablet Orally twice a day    -Zaleplon 10 MG Capsule 1 capsule at bedtime as needed Orally Once a day     -Protriptyline HCl 10 MG Tablet 1 tablet Orally Twice a day    -Prilosec 20 MG Capsule Delayed Release 1 capsule Orally Once a day    -Ocuvite Adult 50+ Capsule Orally    -Ritalin 10 MG Tablet 3 tablets Orally Three times a day    -Lexapro 10 MG Tablet 1 tablet Orally Once a day    -Haloperidol 2 MG Tablet 1 tablet Orally Once a day (bedtime and q 6 prn agitation)    -Vitamin D3 1000 IU Once daily  -Depakote 125 mg Once daily        Past Medical History         End Stage Dementia   -Atrial fibrillation/flutter ('01-failed cardioversion)   -Coronary artery disease (asymptomatic)   -Atherosclerosis-carotid disease with bruit   -Hypertension   -Hyperlipidemia   -Narcolepsy with cataplexy   -Sleep apnea (cpap)   -Osteoarthritis-especially knees/hips (Dr. Oakes)   -Acid reflux-hiatal hernia   -Restless leg syndrome   -Allergies.   -Obesity.   -Low back pain-spinal stenosis by MRI ([place])   -Benign prostate enlargement.   -Diabetes-type II (diet controlled)   -Chronic insomnia   -Peripheral edema   -Chronic dermatitis-eczema.   -Constipation.   -Stress incontinence.   -Bilateral high frequency hearing loss.        Surgical History   s/p abdominal aortic aneurysm repair 2001   s/p right total knee replacement (Dr. Oakes) 2006   s/p left total knee replacement (Dr. Oakes) 2007   s/p right cataract (Dr. Angeles) 2007   s/p colonoscopy-negative except for sigmoid diverticulosis-([place]) 2004,2009   s/p cath-left main (50%) circumflex (60%) right coronary (100%) ejection fraction (45%) 2004        Social History   One MD Social History:    -Alcohol: social  In past  -Caffeine: 1 cup daily  -Tobacco: ex, 2 packs daily for 23 years, stopped 1984  -Occupation: retired, orthodontist  -Marital Status: , Chloe, 2 child (adopted)  -Resides at [facility].      Allergies   -None           Review of Systems   Follow-Up Review of Systems:  Unable to take accurate review of systems due to  patient's dementia.  Per nursing home, has had 2 weeks of mild shortness of breath, with increasing cough After lunch on day of admission (questionable aspiration)          Examination   GENERAL: Comfortable in bed, no acute distress, Response to stimulus and voice.  Unable to communicate due to dementia  NECK/THYROID: no lymphadenopathy, supple  CARDIOVASCULAR: normal S1S2, regular rate and rhythm,, no murmurs  RESPIRATORY: clear to auscultation bilaterally, no wheezes, rhonchi, rales  GASTROINTESTINAL: overweight, BS active, no hepatosplenomegaly, no masses palpated  NEUROLOGIC EXAM: end stage dementia (unchanged) No focal deficits   EXTREMITIES: no clubbing, trace ankle edema (unchanged-baseline).            A/P:  1) Possible aspiration pneumonia-by history, he did have an acute event Yesterday after lunch, resulting in significant increase in his shortness of breath, decreased O2 sats. His chest x-ray showed some bilateral changes in the bases, questionable fluid versus pneumonia.. His O2 sats are improved over the last 12 hours with IV Lasix, and starting Zosyn IV for antibiotic coverage.  His white count was normal with no left shift, but his lactate was elevated.  This has improved overnight. For now, we'll continue the Zosyn IV. We will switch to empiric IV antibodies tomorrow if no further fever and lactate continues to decline.    2) CHF-Given his immediate response to IV Lasix last night in emergency room, the most likely is a strong component of congestive heart failure to his respiratory decline.  This would explain the lack of elevated white count or left shift, and is chest x-ray changes as well. We will continue to watch his volume status, and repeat IV Lasix if needed.  We will restart his oral Lasix tomorrow if stable.    3) Anemia- There is no obvious source of his anemia.  Certainly at high risk being on Eliquis for his atrial fibrillation.  We are awaiting stool 4 hemoccult testing.  His iron  levels were low, Suggesting more of a chronic pattern.  He was transfused last night, and we'll recheck hemoglobin in the morning.  He's also started on oral iron.    4) Mildly elevated LFTs-There is no obvious source for his mildly elevated liver enzymes. He has had no change in exposures, meds, etc.. He not consuming alcohol since Being admitted to the nursing home. We will recheck this in the morning.    5) Dementia-.  The bigger issue here is his continued decline and now end-stage dementia.  The family, patient, per his previous wishes, and myself all agreein no aggressive measures. He is a full DNR.  I will have a discussion with his family regarding markedly decreasing many of the medications that he takes as he is now essentially bedridden.  Clearly with the low hemoglobin, the risks of taking a request to prevent strokes from his atrial fibrillation versus risks of bleeding outweigh continuing this medication.  We will look at changing his medicines during his hospitalization

## 2022-08-16 NOTE — PLAN OF CARE
Problem: Adult Inpatient Plan of Care  Goal: Plan of Care Review  Outcome: Ongoing, Progressing  Flowsheets (Taken 8/16/2022 1620)  Progress: no change  Plan of Care Reviewed With:  • patient  • spouse  • daughter  • son  Outcome Evaluation: Pt's sleeping between care, withdraws from pain, easily aroused. Confused x4 and [place]. No signs of distress. Remains on 2-4L NC with sats 90-93%. Second unit of blood finished this am. Purewick applied, accumax in place. SLP assessed, recommended VFSS tomorrow, remains NPO for now. Bladder scan, no straight cath (see flowsheet). Afib on the monitor. Awaiting stool sample to send to lab. Family updated at bedside. Family to bring CPAP  Goal: Patient-Specific Goal (Individualized)  Outcome: Ongoing, Progressing  Goal: Absence of Hospital-Acquired Illness or Injury  Outcome: Ongoing, Progressing  Intervention: Identify and Manage Fall Risk  Recent Flowsheet Documentation  Taken 8/16/2022 1600 by Max Davila, RN  Safety Promotion/Fall Prevention:  • activity supervised  • assistive device/personal items within reach  • clutter free environment maintained  • fall prevention program maintained  • nonskid shoes/slippers when out of bed  • room organization consistent  • safety round/check completed  Taken 8/16/2022 1433 by Max Davila, RN  Safety Promotion/Fall Prevention:  • activity supervised  • assistive device/personal items within reach  • clutter free environment maintained  • fall prevention program maintained  • nonskid shoes/slippers when out of bed  • room organization consistent  • safety round/check completed  • muscle strengthening facilitated  • lighting adjusted  Taken 8/16/2022 1202 by Max Davila, RN  Safety Promotion/Fall Prevention:  • assistive device/personal items within reach  • activity supervised  • clutter free environment maintained  • fall prevention program maintained  • safety round/check completed  • room organization  consistent  • nonskid shoes/slippers when out of bed  Taken 8/16/2022 1031 by Max Davila RN  Safety Promotion/Fall Prevention:  • activity supervised  • assistive device/personal items within reach  • clutter free environment maintained  • fall prevention program maintained  • nonskid shoes/slippers when out of bed  • muscle strengthening facilitated  • safety round/check completed  • lighting adjusted  • room organization consistent  Taken 8/16/2022 0853 by Max Davila RN  Safety Promotion/Fall Prevention:  • activity supervised  • clutter free environment maintained  • assistive device/personal items within reach  • fall prevention program maintained  • nonskid shoes/slippers when out of bed  • room organization consistent  • safety round/check completed  • lighting adjusted  Intervention: Prevent Skin Injury  Recent Flowsheet Documentation  Taken 8/16/2022 1600 by Max Davila RN  Body Position:  • left  • turned  Taken 8/16/2022 1433 by Max Davila RN  Body Position: supine  Taken 8/16/2022 1202 by Max Davila RN  Body Position:  • left  • tilted  Taken 8/16/2022 1031 by Max Davila RN  Body Position:  • tilted  • right  • turned  Taken 8/16/2022 0853 by Max Davila RN  Body Position: supine  Skin Protection:  • adhesive use limited  • tubing/devices free from skin contact  • transparent dressing maintained  Intervention: Prevent and Manage VTE (Venous Thromboembolism) Risk  Recent Flowsheet Documentation  Taken 8/16/2022 1600 by Max Davila RN  Activity Management: activity adjusted per tolerance  Taken 8/16/2022 1433 by Max Davila RN  Activity Management: activity adjusted per tolerance  Taken 8/16/2022 1202 by Max Davila RN  Activity Management: activity adjusted per tolerance  Taken 8/16/2022 1031 by Max Davila RN  Activity Management: activity adjusted per tolerance  Taken 8/16/2022  0853 by Max Davila, RN  Activity Management:  • activity encouraged  • activity adjusted per tolerance  Intervention: Prevent Infection  Recent Flowsheet Documentation  Taken 8/16/2022 0853 by Max Davila RN  Infection Prevention: single patient room provided  Goal: Optimal Comfort and Wellbeing  Outcome: Ongoing, Progressing  Intervention: Monitor Pain and Promote Comfort  Recent Flowsheet Documentation  Taken 8/16/2022 0853 by Max Davila RN  Pain Management Interventions:  • quiet environment facilitated  • care clustered  Goal: Readiness for Transition of Care  Outcome: Ongoing, Progressing     Problem: Adjustment to Illness (Heart Failure)  Goal: Optimal Coping  Outcome: Ongoing, Progressing  Intervention: Support Psychosocial Response  Recent Flowsheet Documentation  Taken 8/16/2022 0853 by Max Davila RN  Supportive Measures:  • active listening utilized  • verbalization of feelings encouraged  Family/Support System Care:  • support provided  • self-care encouraged     Problem: Cardiac Output Decreased (Heart Failure)  Goal: Optimal Cardiac Output  Outcome: Ongoing, Progressing     Problem: Dysrhythmia (Heart Failure)  Goal: Stable Heart Rate and Rhythm  Outcome: Ongoing, Progressing     Problem: Fluid Imbalance (Heart Failure)  Goal: Fluid Balance  Outcome: Ongoing, Progressing     Problem: Functional Ability Impaired (Heart Failure)  Goal: Optimal Functional Ability  Outcome: Ongoing, Progressing  Intervention: Optimize Functional Ability  Recent Flowsheet Documentation  Taken 8/16/2022 1600 by Max Davila, RN  Activity Management: activity adjusted per tolerance  Taken 8/16/2022 1433 by Max Davila, RN  Activity Management: activity adjusted per tolerance  Taken 8/16/2022 1202 by Max Davila, RN  Activity Management: activity adjusted per tolerance  Taken 8/16/2022 1031 by Max Davila, RN  Activity Management: activity  adjusted per tolerance  Taken 8/16/2022 0853 by Max Davila RN  Activity Management:  • activity encouraged  • activity adjusted per tolerance     Problem: Oral Intake Inadequate (Heart Failure)  Goal: Optimal Nutrition Intake  Outcome: Ongoing, Progressing     Problem: Respiratory Compromise (Heart Failure)  Goal: Effective Oxygenation and Ventilation  Outcome: Ongoing, Progressing  Intervention: Promote Airway Secretion Clearance  Recent Flowsheet Documentation  Taken 8/16/2022 0853 by Max Davila RN  Cough And Deep Breathing: done independently per patient  Intervention: Optimize Oxygenation and Ventilation  Recent Flowsheet Documentation  Taken 8/16/2022 1433 by Max Davila RN  Airway/Ventilation Management: airway patency maintained     Problem: Sleep Disordered Breathing (Heart Failure)  Goal: Effective Breathing Pattern During Sleep  Outcome: Ongoing, Progressing     Problem: Fluid Imbalance (Pneumonia)  Goal: Fluid Balance  Outcome: Ongoing, Progressing     Problem: Infection (Pneumonia)  Goal: Resolution of Infection Signs and Symptoms  Outcome: Ongoing, Progressing     Problem: Respiratory Compromise (Pneumonia)  Goal: Effective Oxygenation and Ventilation  Outcome: Ongoing, Progressing  Intervention: Promote Airway Secretion Clearance  Recent Flowsheet Documentation  Taken 8/16/2022 0853 by Max Davila RN  Cough And Deep Breathing: done independently per patient  Intervention: Optimize Oxygenation and Ventilation  Recent Flowsheet Documentation  Taken 8/16/2022 1600 by Max Davila RN  Head of Bed (HOB) Positioning: HOB elevated  Taken 8/16/2022 1433 by Max Davila RN  Head of Bed ([facility]) Positioning: HOB lowered  Airway/Ventilation Management: airway patency maintained  Taken 8/16/2022 1202 by Max Davila RN  Head of Bed (HOB) Positioning: HOB elevated  Taken 8/16/2022 1031 by Max Davila RN  Head of Bed ([facility])  Positioning: HOB elevated  Taken 8/16/2022 0853 by Max Davila RN  Head of Bed (HOB) Positioning: HOB elevated     Problem: Fall Injury Risk  Goal: Absence of Fall and Fall-Related Injury  Outcome: Ongoing, Progressing  Intervention: Identify and Manage Contributors  Recent Flowsheet Documentation  Taken 8/16/2022 1600 by Max Davila RN  Medication Review/Management: medications reviewed  Taken 8/16/2022 1433 by Max Davila RN  Medication Review/Management: medications reviewed  Taken 8/16/2022 1202 by Max Davila RN  Medication Review/Management: medications reviewed  Taken 8/16/2022 1031 by Max Davlia RN  Medication Review/Management: medications reviewed  Taken 8/16/2022 0853 by Max Davila RN  Medication Review/Management: medications reviewed  Intervention: Promote Injury-Free Environment  Recent Flowsheet Documentation  Taken 8/16/2022 1600 by Max Davila RN  Safety Promotion/Fall Prevention:  • activity supervised  • assistive device/personal items within reach  • clutter free environment maintained  • fall prevention program maintained  • nonskid shoes/slippers when out of bed  • room organization consistent  • safety round/check completed  Taken 8/16/2022 1433 by Max Davila RN  Safety Promotion/Fall Prevention:  • activity supervised  • assistive device/personal items within reach  • clutter free environment maintained  • fall prevention program maintained  • nonskid shoes/slippers when out of bed  • room organization consistent  • safety round/check completed  • muscle strengthening facilitated  • lighting adjusted  Taken 8/16/2022 1202 by Max Davila RN  Safety Promotion/Fall Prevention:  • assistive device/personal items within reach  • activity supervised  • clutter free environment maintained  • fall prevention program maintained  • safety round/check completed  • room organization consistent  • nonskid  shoes/slippers when out of bed  Taken 8/16/2022 1031 by Max Davila, RN  Safety Promotion/Fall Prevention:  • activity supervised  • assistive device/personal items within reach  • clutter free environment maintained  • fall prevention program maintained  • nonskid shoes/slippers when out of bed  • muscle strengthening facilitated  • safety round/check completed  • lighting adjusted  • room organization consistent  Taken 8/16/2022 0853 by Max Davila RN  Safety Promotion/Fall Prevention:  • activity supervised  • clutter free environment maintained  • assistive device/personal items within reach  • fall prevention program maintained  • nonskid shoes/slippers when out of bed  • room organization consistent  • safety round/check completed  • lighting adjusted     Problem: Asthma Comorbidity  Goal: Maintenance of Asthma Control  Outcome: Ongoing, Progressing  Intervention: Maintain Asthma Symptom Control  Recent Flowsheet Documentation  Taken 8/16/2022 1600 by Mxa Davila RN  Medication Review/Management: medications reviewed  Taken 8/16/2022 1433 by Max Davila RN  Medication Review/Management: medications reviewed  Taken 8/16/2022 1202 by Max Davila RN  Medication Review/Management: medications reviewed  Taken 8/16/2022 1031 by Max Davila RN  Medication Review/Management: medications reviewed  Taken 8/16/2022 0853 by Max Davila RN  Medication Review/Management: medications reviewed     Problem: Behavioral Health Comorbidity  Goal: Maintenance of Behavioral Health Symptom Control  Outcome: Ongoing, Progressing  Intervention: Maintain Behavioral Health Symptom Control  Recent Flowsheet Documentation  Taken 8/16/2022 1600 by Max Davila RN  Medication Review/Management: medications reviewed  Taken 8/16/2022 1433 by Max Davila RN  Medication Review/Management: medications reviewed  Taken 8/16/2022 1202 by Ramiro De Los Santos  MICHELLE Santana  Medication Review/Management: medications reviewed  Taken 8/16/2022 1031 by Max Davila RN  Medication Review/Management: medications reviewed  Taken 8/16/2022 0853 by Max Davila RN  Medication Review/Management: medications reviewed     Problem: COPD (Chronic Obstructive Pulmonary Disease) Comorbidity  Goal: Maintenance of COPD Symptom Control  Outcome: Ongoing, Progressing  Intervention: Maintain COPD-Symptom Control  Recent Flowsheet Documentation  Taken 8/16/2022 1600 by Max Davila RN  Medication Review/Management: medications reviewed  Taken 8/16/2022 1433 by Max Davila RN  Medication Review/Management: medications reviewed  Taken 8/16/2022 1202 by Max Davila RN  Medication Review/Management: medications reviewed  Taken 8/16/2022 1031 by Max Davila RN  Medication Review/Management: medications reviewed  Taken 8/16/2022 0853 by Max Davila RN  Supportive Measures:  • active listening utilized  • verbalization of feelings encouraged  Medication Review/Management: medications reviewed     Problem: Diabetes Comorbidity  Goal: Blood Glucose Level Within Targeted Range  Outcome: Ongoing, Progressing     Problem: Heart Failure Comorbidity  Goal: Maintenance of Heart Failure Symptom Control  Outcome: Ongoing, Progressing  Intervention: Maintain Heart Failure-Management  Recent Flowsheet Documentation  Taken 8/16/2022 1600 by Max Davila RN  Medication Review/Management: medications reviewed  Taken 8/16/2022 1433 by Max Davila RN  Medication Review/Management: medications reviewed  Taken 8/16/2022 1202 by Max Davila RN  Medication Review/Management: medications reviewed  Taken 8/16/2022 1031 by Max Davila RN  Medication Review/Management: medications reviewed  Taken 8/16/2022 0853 by Max Davila RN  Medication Review/Management: medications reviewed     Problem:  Hypertension Comorbidity  Goal: Blood Pressure in Desired Range  Outcome: Ongoing, Progressing  Intervention: Maintain Blood Pressure Management  Recent Flowsheet Documentation  Taken 8/16/2022 1600 by Max Davila RN  Medication Review/Management: medications reviewed  Taken 8/16/2022 1433 by Max Davila RN  Medication Review/Management: medications reviewed  Taken 8/16/2022 1202 by Max Davila RN  Medication Review/Management: medications reviewed  Taken 8/16/2022 1031 by Max Davila RN  Medication Review/Management: medications reviewed  Taken 8/16/2022 0853 by Max Davila RN  Medication Review/Management: medications reviewed     Problem: Obstructive Sleep Apnea Risk or Actual Comorbidity Management  Goal: Unobstructed Breathing During Sleep  Outcome: Ongoing, Progressing     Problem: Osteoarthritis Comorbidity  Goal: Maintenance of Osteoarthritis Symptom Control  Outcome: Ongoing, Progressing  Intervention: Maintain Osteoarthritis Symptom Control  Recent Flowsheet Documentation  Taken 8/16/2022 1600 by Max Davila RN  Activity Management: activity adjusted per tolerance  Medication Review/Management: medications reviewed  Taken 8/16/2022 1433 by Max Davila RN  Activity Management: activity adjusted per tolerance  Medication Review/Management: medications reviewed  Taken 8/16/2022 1202 by Max Davila RN  Activity Management: activity adjusted per tolerance  Medication Review/Management: medications reviewed  Taken 8/16/2022 1031 by Max Davila RN  Activity Management: activity adjusted per tolerance  Medication Review/Management: medications reviewed  Taken 8/16/2022 0853 by Max Davila RN  Activity Management:  • activity encouraged  • activity adjusted per tolerance  Medication Review/Management: medications reviewed     Problem: Pain Chronic (Persistent) (Comorbidity Management)  Goal: Acceptable Pain  Control and Functional Ability  Outcome: Ongoing, Progressing  Intervention: Manage Persistent Pain  Recent Flowsheet Documentation  Taken 8/16/2022 1600 by Max Davila RN  Medication Review/Management: medications reviewed  Taken 8/16/2022 1433 by Max Davila RN  Medication Review/Management: medications reviewed  Taken 8/16/2022 1202 by Max Davila RN  Medication Review/Management: medications reviewed  Taken 8/16/2022 1031 by Max Davila RN  Medication Review/Management: medications reviewed  Taken 8/16/2022 0853 by Max Davila RN  Medication Review/Management: medications reviewed  Intervention: Develop Pain Management Plan  Recent Flowsheet Documentation  Taken 8/16/2022 0853 by Max Davila RN  Pain Management Interventions:  • quiet environment facilitated  • care clustered  Intervention: Optimize Psychosocial Wellbeing  Recent Flowsheet Documentation  Taken 8/16/2022 0853 by Max Davila RN  Supportive Measures:  • active listening utilized  • verbalization of feelings encouraged  Family/Support System Care:  • support provided  • self-care encouraged     Problem: Seizure Disorder Comorbidity  Goal: Maintenance of Seizure Control  Outcome: Ongoing, Progressing     Problem: Skin Injury Risk Increased  Goal: Skin Health and Integrity  Outcome: Ongoing, Progressing  Intervention: Optimize Skin Protection  Recent Flowsheet Documentation  Taken 8/16/2022 1600 by Max Davila RN  Head of Bed (HOB) Positioning: HOB elevated  Taken 8/16/2022 1433 by Max Davila RN  Head of Bed (HOB) Positioning: HOB lowered  Pressure Reduction Devices: alternating pressure pump (ADD)  Taken 8/16/2022 1202 by Max Davila RN  Head of Bed (HOB) Positioning: HOB elevated  Taken 8/16/2022 1031 by Max Davila RN  Head of Bed (HOB) Positioning: HOB elevated  Taken 8/16/2022 0853 by Max Davlia RN  Pressure  Reduction Techniques:  • sit time limited to 2 hours  • heels elevated off bed  • pressure points protected  Head of Bed (HOB) Positioning: HOB elevated  Pressure Reduction Devices: positioning supports utilized  Skin Protection:  • adhesive use limited  • tubing/devices free from skin contact  • transparent dressing maintained   Goal Outcome Evaluation:  Plan of Care Reviewed With: patient, spouse, daughter, son        Progress: no change  Outcome Evaluation: Pt's sleeping between care, withdraws from pain, easily aroused. Confused x4 and [place]. No signs of distress. Remains on 2-4L NC with sats 90-93%. Second unit of blood finished this am. Purewick applied, accumax in place. SLP assessed, recommended VFSS tomorrow, remains NPO for now. Bladder scan, no straight cath (see flowsheet). Afib on the monitor. Family updated at bedside. Family to bring CPAP. Awaiting stool sample to send to lab

## 2022-08-16 NOTE — PLAN OF CARE
Goal Outcome Evaluation:  Plan of Care Reviewed With: patient           Outcome Evaluation: Patient seen for clinical swallow assessment. Family present. Pt with advanced alzheimer's, difficulty following commands. Voice strong. Pt required alerting cues to participate, which is his current baseline per family. Pt is a total feed at baseline, on nectar and mech soft. No overt s/s of aspiration with multiple trials of puree. Strong cough with honey via spoon 1/10 trials and immediate cough with honey via cup x1. SLP recs NPO. Critical meds crushed in puree. Will follow for VFSS next date, family agrees with recommendations. SLP introduced goals of care conversation, family reported patient does not want a feeding tube. Will await VFSS results before further GOC discussion.       Patient was not wearing a face mask during this therapy encounter. Therapist used appropriate personal protective equipment including mask, eye protection and gloves.  Mask used was standard procedure mask. Appropriate PPE was worn during the entire therapy session. Hand hygiene was completed before and after therapy session. Patient is not in enhanced droplet precautions.

## 2022-08-16 NOTE — THERAPY EVALUATION
Acute Care - Speech Language Pathology   Swallow Initial Evaluation [facility]     Patient Name: Alton Mayes  : 1931  MRN: [id number]  Today's Date: 2022               Admit Date: 8/15/2022    Visit Dx:     ICD-10-CM ICD-9-CM   1. Acute respiratory failure with hypoxia ([facility])  J96.01 518.81   2. Acute combined systolic and diastolic congestive heart failure ([facility])  I50.41 428.41     428.0   3. Aspiration pneumonia, unspecified aspiration pneumonia type, unspecified laterality, unspecified part of lung ([facility])  J69.0 507.0   4. Alzheimer's dementia with behavioral disturbance, unspecified timing of dementia onset ([facility])  G30.9 331.0    F02.81 294.11     Patient Active Problem List   Diagnosis   • Acute respiratory failure with hypoxia ([facility])   • CHF (congestive heart failure) ([facility])     Past Medical History:   Diagnosis Date   • Narcolepsy      Past Surgical History:   Procedure Laterality Date   • AORTA SURGERY     • KNEE SURGERY Bilateral        SLP Recommendation and Plan  SLP Swallowing Diagnosis: suspected pharyngeal dysphagia (22)  SLP Diet Recommendation: NPO (22)  Recommended Precautions and Strategies: upright posture during/after eating (22)  SLP Rec. for Method of Medication Administration: meds crushed, with pudding or applesauce, as tolerated, meds via alternate route (22)     Monitor for Signs of Aspiration: yes, notify SLP if any concerns (22)  Recommended Diagnostics: reassess via VFSS ([facility]) (22)  Swallow Criteria for Skilled Therapeutic Interventions Met: demonstrates skilled criteria (22)  Anticipated Discharge Disposition (SLP): unknown (22)  Rehab Potential/Prognosis, Swallowing: good, to achieve stated therapy goals (22)  Therapy Frequency (Swallow): PRN (22)  Predicted Duration Therapy Intervention (Days): until discharge (22)                                   Plan of Care Reviewed With: patient  Outcome Evaluation: Patient seen for clinical swallow assessment. Family present. Pt with advanced alzheimer's, difficulty following commands. Voice strong. Pt required alerting cues to participate, which is his current baseline per family. Pt is a total feed at baseline, on nectar and mech soft. No overt s/s of aspiration with multiple trials of puree. Strong cough with honey via spoon 1/10 trials and immediate cough with honey via cup x1. SLP recs NPO. Critical meds crushed in puree. Will follow for VFSS next date, family agrees with recommendations. SLP introduced goals of care conversation, family reported patient does not want a feeding tube.      SWALLOW EVALUATION (last 72 hours)     SLP Adult Swallow Evaluation     Row Name 08/16/22 0900                   Rehab Evaluation    Document Type evaluation  -        Patient/Family/Caregiver Comments/Observations son, daughter, wife present  -        Patient Effort fair  -                  General Information    Patient Profile Reviewed yes  -        Pertinent History Of Current Problem Suspected aspiration event, advanced dementia  -        Current Method of Nutrition NPO  -        Precautions/Limitations, Vision other (see comments)  eyes shut most of the assessment, suspect vision impairment  -        Precautions/Limitations, Hearing WFL;for purposes of eval  -        Prior Level of Function-Communication cognitive-linguistic impairment  -        Prior Level of Function-Swallowing mechanical soft textures;nectar thick liquids  -        Plans/Goals Discussed with patient;agreed upon  -        Barriers to Rehab medically complex  -                  Pain    Additional Documentation Pain Scale: FACES Pre/Post-Treatment (Group)  -                  Pain Scale: FACES Pre/Post-Treatment    Pain: FACES Scale, Pretreatment 0-->no hurt  -                  Clinical Swallow Eval    Clinical Swallow  Evaluation Summary Patient seen for clinical swallow assessment. Family present. Pt with advanced alzheimer's, difficulty following commands. Voice strong. Pt required alerting cues to participate, which is his current baseline per family. Pt is a total feed at baseline, on nectar and mech soft. No overt s/s of aspiration with multiple trials of puree. Strong cough with honey via spoon 1/10 trials and immediate cough with honey via cup x1. SLP recs NPO. Critical meds crushed in puree. Will follow for VFSS next date, family agrees with recommendations. SLP introduced goals of care conversation, family reported patient does not want a feeding tube. Will await VFSS results before further GOC discussion.  -                  SLP Evaluation Clinical Impression    SLP Swallowing Diagnosis suspected pharyngeal dysphagia  -        Functional Impact risk of aspiration/pneumonia  -        Rehab Potential/Prognosis, Swallowing good, to achieve stated therapy goals  -        Swallow Criteria for Skilled Therapeutic Interventions Met demonstrates skilled criteria  -                  Recommendations    Therapy Frequency (Swallow) PRN  -        Predicted Duration Therapy Intervention (Days) until discharge  -        SLP Diet Recommendation NPO  -        Recommended Diagnostics reassess via VFSS ([facility])  -        Recommended Precautions and Strategies upright posture during/after eating  -        Oral Care Recommendations Oral Care BID/PRN  -        SLP Rec. for Method of Medication Administration meds crushed;with pudding or applesauce;as tolerated;meds via alternate route  -        Monitor for Signs of Aspiration yes;notify SLP if any concerns  -        Anticipated Discharge Disposition (SLP) unknown  -                  Swallow Goals (SLP)    Swallow STGs diet tolerance goal selection (SLP)  -        Diet Tolerance Goal Selection (SLP) Patient will tolerate trials of  -                  (STG) Patient  will tolerate trials of    Consistencies Trialed (Tolerate trials) pureed textures;honey/ moderately thick liquids  -SH        Desired Outcome (Tolerate trials) without signs/symptoms of aspiration  -SH              User Key  (r) = Recorded By, (t) = Taken By, (c) = Cosigned By    Initials Name Effective Dates    Caitie Finch MS CCC-SLP 06/16/21 -                 EDUCATION  The patient has been educated in the following areas:   Dysphagia (Swallowing Impairment).        SLP GOALS     Row Name 08/16/22 0900             (STG) Patient will tolerate trials of    Consistencies Trialed (Tolerate trials) pureed textures;honey/ moderately thick liquids  -SH      Desired Outcome (Tolerate trials) without signs/symptoms of aspiration  -SH            User Key  (r) = Recorded By, (t) = Taken By, (c) = Cosigned By    Initials Name Provider Type    Caitie Finch MS CCC-SLP Speech and Language Pathologist                   Time Calculation:    Time Calculation- SLP     Row Name 08/16/22 1545             Time Calculation- SLP    SLP Start Time 0900  -      SLP Received On 08/16/22  -              Untimed Charges    51660-ZA Eval Oral Pharyng Swallow Minutes 75  -SH              Total Minutes    Untimed Charges Total Minutes 75  -SH       Total Minutes 75  -SH            User Key  (r) = Recorded By, (t) = Taken By, (c) = Cosigned By    Initials Name Provider Type    Caitie Finch MS CCC-SLP Speech and Language Pathologist                Therapy Charges for Today     Code Description Service Date Service Provider Modifiers Qty    [id number] HC ST EVAL ORAL PHARYNG SWALLOW 5 8/16/2022 Caitie Milian MS CCC-JOSELUIS GN 1               MS TOÑO [place]  8/16/2022

## 2022-08-16 NOTE — CASE MANAGEMENT/SOCIAL WORK
Discharge Planning Assessment  [facility]     Patient Name: Alton Mayes  MRN: [id number]  Today's Date: 8/16/2022    Admit Date: 8/15/2022     Discharge Needs Assessment     Row Name 08/16/22 1373       Living Environment    People in Home other (see comments)    Unique Family Situation Lives at [place]    Current Living Arrangements assisted living facility    Primary Care Provided by homecare agency    Provides Primary Care For no one, unable/limited ability to care for self    Family Caregiver if Needed other (see comments)    Quality of Family Relationships involved;helpful    Able to Return to Prior Arrangements yes    Living Arrangement Comments Lives at [facility]       Resource/Environmental Concerns    Resource/Environmental Concerns none    Transportation Concerns no car       Transition Planning    Patient/Family Anticipates Transition to home    Patient/Family Anticipated Services at Transition none    Transportation Anticipated other (see comments)  Will need EMS vs Stretcher van       Discharge Needs Assessment    Equipment Currently Used at Home cpap;wheelchair    Concerns to be Addressed adjustment to diagnosis/illness    Anticipated Changes Related to Illness none    Equipment Needed After Discharge none               Discharge Plan     Row Name 08/16/22 2373       Plan    Plan Return to [facility] via EMS    Patient/Family in Agreement with Plan yes    Plan Comments Spoke with pt. son Jose Alfredo and daughter Magaly at bedside, introduced self and explained CCP role. Confirmed face sheet and pharmacy information.  They asked that we contact them first instead of pt wife Chloe just because she can’t remember what we tell her. Pt lives at [facility] and is total care there they feed, dress, toilet and manage meds for him. He can return at d/c. They can accomodate dysphagia diet. He will need packet and report called to facility at [phone]. He won’t need  negative Covid swab. Pt gets some meds through facility and some through [facility], per director they would like any new meds sent to [facility] on [address], updated in EPIC. Updated family contacts as well. Per director they have been trying to get [facility] and have been met with a lot of resistance, spoke with Magaly and she stated they would like [facility] consult for following at d/c. Order entered, and called. Pt will need EMS/Stretcher van at d/c. CCP will follow for needs.  - Caitie DARLING              Continued Care and Services - Admitted Since 8/15/2022    Coordination has not been started for this encounter.       Expected Discharge Date and Time     Expected Discharge Date Expected Discharge Time    Aug 19, 2022          Demographic Summary     Row Name 08/16/22 1359       General Information    Admission Type inpatient               Functional Status     Row Name 08/16/22 1351       Functional Status    Usual Activity Tolerance poor    Current Activity Tolerance poor       Assessment of Health Literacy    Health Literacy Low       Functional Status, IADL    Medications completely dependent    Meal Preparation completely dependent    Housekeeping completely dependent    Laundry completely dependent    Shopping completely dependent    IADL Comments Lives at facility       Mental Status    General Appearance WDL WDL       Mental Status Summary    Recent Changes in Mental Status/Cognitive Functioning decision-making/judgment;ability to understand;ability to speak clearly               Psychosocial    No documentation.                Abuse/Neglect    No documentation.                Legal     Row Name 08/16/22 1354       Financial/Legal    Who Manages Finances if Patient Unable Wife Chloe               Substance Abuse    No documentation.                Patient Forms    No documentation.                   Caitie Maxwell RN

## 2022-08-17 ENCOUNTER — APPOINTMENT (OUTPATIENT)
Dept: GENERAL RADIOLOGY | Facility: HOSPITAL | Age: 87
End: 2022-08-17

## 2022-08-17 LAB
BH BB BLOOD EXPIRATION DATE: NORMAL
BH BB BLOOD EXPIRATION DATE: NORMAL
BH BB BLOOD TYPE BARCODE: 7300
BH BB BLOOD TYPE BARCODE: 7300
BH BB DISPENSE STATUS: NORMAL
BH BB DISPENSE STATUS: NORMAL
BH BB PRODUCT CODE: NORMAL
BH BB PRODUCT CODE: NORMAL
BH BB UNIT NUMBER: NORMAL
BH BB UNIT NUMBER: NORMAL
CROSSMATCH INTERPRETATION: NORMAL
CROSSMATCH INTERPRETATION: NORMAL
UNIT  ABO: NORMAL
UNIT  ABO: NORMAL
UNIT  RH: NORMAL
UNIT  RH: NORMAL

## 2022-08-17 PROCEDURE — 74230 X-RAY XM SWLNG FUNCJ C+: CPT

## 2022-08-17 PROCEDURE — 92611 MOTION FLUOROSCOPY/SWALLOW: CPT

## 2022-08-17 PROCEDURE — 25010000002 PIPERACILLIN SOD-TAZOBACTAM PER 1 G: Performed by: INTERNAL MEDICINE

## 2022-08-17 RX ORDER — FUROSEMIDE 20 MG/1
20 TABLET ORAL DAILY
Status: DISCONTINUED | OUTPATIENT
Start: 2022-08-17 | End: 2022-08-18 | Stop reason: HOSPADM

## 2022-08-17 RX ORDER — PROTRIPTYLINE HYDROCHLORIDE 5 MG/1
10 TABLET, FILM COATED ORAL 2 TIMES DAILY
Status: DISCONTINUED | OUTPATIENT
Start: 2022-08-17 | End: 2022-08-17

## 2022-08-17 RX ORDER — RISPERIDONE 0.5 MG/1
0.5 TABLET ORAL 4 TIMES DAILY PRN
Status: DISCONTINUED | OUTPATIENT
Start: 2022-08-17 | End: 2022-08-17

## 2022-08-17 RX ORDER — RISPERIDONE 0.5 MG/1
0.5 TABLET ORAL EVERY 4 HOURS PRN
Status: DISCONTINUED | OUTPATIENT
Start: 2022-08-17 | End: 2022-08-18 | Stop reason: HOSPADM

## 2022-08-17 RX ORDER — ATENOLOL 25 MG/1
12.5 TABLET ORAL DAILY
Status: DISCONTINUED | OUTPATIENT
Start: 2022-08-17 | End: 2022-08-18 | Stop reason: HOSPADM

## 2022-08-17 RX ADMIN — METHYLPHENIDATE HYDROCHLORIDE 30 MG: 10 TABLET ORAL at 08:18

## 2022-08-17 RX ADMIN — PROTRIPTYLINE HYDROCHLORIDE 10 MG: 10 TABLET ORAL at 21:31

## 2022-08-17 RX ADMIN — ATENOLOL 12.5 MG: 25 TABLET ORAL at 10:57

## 2022-08-17 RX ADMIN — ATORVASTATIN CALCIUM 20 MG: 20 TABLET, FILM COATED ORAL at 08:18

## 2022-08-17 RX ADMIN — Medication 10 ML: at 21:31

## 2022-08-17 RX ADMIN — MEMANTINE HYDROCHLORIDE 10 MG: 10 TABLET, FILM COATED ORAL at 08:18

## 2022-08-17 RX ADMIN — TAZOBACTAM SODIUM AND PIPERACILLIN SODIUM 3.38 G: 375; 3 INJECTION, SOLUTION INTRAVENOUS at 08:17

## 2022-08-17 RX ADMIN — PROTRIPTYLINE HYDROCHLORIDE 10 MG: 10 TABLET ORAL at 13:04

## 2022-08-17 RX ADMIN — PANTOPRAZOLE SODIUM 40 MG: 40 TABLET, DELAYED RELEASE ORAL at 08:18

## 2022-08-17 RX ADMIN — BARIUM SULFATE 4 ML: 980 POWDER, FOR SUSPENSION ORAL at 10:42

## 2022-08-17 RX ADMIN — BARIUM SULFATE 50 ML: 400 SUSPENSION ORAL at 10:42

## 2022-08-17 RX ADMIN — FUROSEMIDE 20 MG: 20 TABLET ORAL at 10:57

## 2022-08-17 RX ADMIN — FERROUS SULFATE TAB 325 MG (65 MG ELEMENTAL FE) 325 MG: 325 (65 FE) TAB at 08:18

## 2022-08-17 RX ADMIN — RISPERIDONE 0.5 MG: 0.5 TABLET, FILM COATED ORAL at 08:19

## 2022-08-17 RX ADMIN — TAZOBACTAM SODIUM AND PIPERACILLIN SODIUM 3.38 G: 375; 3 INJECTION, SOLUTION INTRAVENOUS at 16:08

## 2022-08-17 RX ADMIN — POTASSIUM CHLORIDE 20 MEQ: 750 TABLET, EXTENDED RELEASE ORAL at 08:18

## 2022-08-17 RX ADMIN — ESCITALOPRAM 10 MG: 10 TABLET, FILM COATED ORAL at 08:18

## 2022-08-17 RX ADMIN — DIVALPROEX SODIUM 125 MG: 125 CAPSULE, COATED PELLETS ORAL at 08:18

## 2022-08-17 RX ADMIN — RISPERIDONE 0.5 MG: 0.5 TABLET, FILM COATED ORAL at 21:30

## 2022-08-17 RX ADMIN — Medication 1000 UNITS: at 08:18

## 2022-08-17 RX ADMIN — TAMSULOSIN HYDROCHLORIDE 0.4 MG: 0.4 CAPSULE ORAL at 21:30

## 2022-08-17 RX ADMIN — Medication 10 ML: at 08:21

## 2022-08-17 NOTE — PROGRESS NOTES
"Patient doing better, appears to be at his baseline alertness and function from NH. Sleeping soundly, but arouses easily to voice, conversant, but confused (Baseline). Appears comfortable and denies pain. Afebrile x 48 hours. BP stable, with occasional low BP (this a.m. 91/81). O2 sats on 2 lpm 94-98%, drops slightly to 90-02% on RA. No coughing during visit.    EXAM:  Blood pressure 91/81, pulse 97, temperature 97.6 °F (36.4 °C), temperature source Oral, resp. rate 18, height 172 cm (67.72\"), weight 108 kg (237 lb 7 oz), SpO2 94 %.    HEENT-mm's moist  NECK-no JVD  HEART-irreg S1S2  LUNGS-unlabored, clear throughout, mildly decreased BS in bases no rales/rhonchi  ABD-soft, +BS, nontender  EXT-mild edema (chronic)    LABS: pending    A/P:   1) Aspiration pneumonia-afebrile, much improved. O2 sats improved on 2lpm. Cultures grwoing a staph species (not identified yet), await sensitivities. Continue Zosyn, hopefully to p.o. antibiotics after sensitivities available. Did well on bedside swallow with thickened/pureed foods, but immediate coughing with thin liquids. Aspiration most likely due to dysphagia from progressive dementia.Continue thickened liquid diet. Await FL swallow study.    2) Congestive heart failure- Improved. Clinically stable. We are limited on treatment due to low BPs. Will decrease atenolol to hopefully allow him to tolerate daily diuretic (furosemide). Start oral furosemide today.    3) Anemia-? Source. Hgb increased from 7.1 to 9.3 after 2 units PRBC yesterday.   Most likely UGI due to his history of GERD and PUD, compounded by anticoag with Eliquis. Currently off Eliquis. Do not feel aggressive workup for source of bleed indicated at this point given his overall health status. Strongly feel risks of serious bleed out weight slight risk for stroke from atrial fibrillation, especially given his markedly declining mental status/dementia and poor quality of life. Currently on iron, PPI, will recheck " Hgb in a.m.    4) Mildly elevated-LFTs-most likl;ey due to CHF, await a.m. labs.    5) Dementia (end stage)-per patient and wishes, will continue reasonable care, no aggressive or heroic measures, DNR. Continue comfort care and support. Back to [place] in next 24-48 hours hopefully/

## 2022-08-17 NOTE — PLAN OF CARE
Goal Outcome Evaluation:  Plan of Care Reviewed With: patient        Progress: no change  Outcome Evaluation: Patient sleeping in between care. Results of video swallow pending. Makes needs known. Turned and repositioned by staff. Family at bedside. INC of B & B. Continues with IV atb therapy. No s/s of pain or distress noted.

## 2022-08-17 NOTE — PLAN OF CARE
Goal Outcome Evaluation:  Plan of Care Reviewed With: patient           Outcome Evaluation: VFSS completed. Recommend nectar thick by cup or straw / mechanical soft (no mixed), which is patient's diet at baseline. Medications crushed with puree. Precautions: upright, slow rate, small bites/sips, alternate solids with liquids, 1:1 assist/supervision, allow po when alert, reflux precautions. ST to follow for diet tolerance. If concern for aspiration increases, recommend NPO.    Patient was not wearing a face mask during this therapy encounter. Therapist used appropriate personal protective equipment including mask, eye protection and gloves.  Mask used was standard procedure mask. Appropriate PPE was worn during the entire therapy session. Hand hygiene was completed before and after therapy session. Patient is not in enhanced droplet precautions.

## 2022-08-17 NOTE — PROGRESS NOTES
"Nutrition Services    Patient Name:  Alton Mayes  YOB: 1931  MRN: [id number]  Admit Date:  8/15/2022      Comment: MST 2/ difficulty chewing/swallowing    CC: dyspnea, low O2 sats, aspiration  Dx: aspiration PNA, CHF, Alzheimer's dementia, anemia    NPO now, awaiting VFSS w/ SLP today     Recommend:  1. Advance diet as tolerates per SLP recommendations.    Will follow per protocol.    CLINICAL NUTRITION ASSESSMENT      Reason for Assessment MST score 2+ difficulty chewing/swallowing     H&P  CC: dyspnea, low O2 sats, aspiration  Dx: aspiration PNA, CHF, Alzheimer's dementia, anemia    Past Medical History:   Diagnosis Date   • Narcolepsy        Past Surgical History:   Procedure Laterality Date   • AORTA SURGERY  2001   • KNEE SURGERY Bilateral           Nutritional Risk Screening       MST SCORE 2   Risk Screening Criteria Difficulty chewing/swallowing     Encounter Information        Nutrition/Diet History:    Pt here from NH, total feed @ NH. Pt had decreased respiratory status (SOB, decreased O2 sats) after eating lunch at NH. Awaiting VFSS with SLP today.   Factors Affecting Intake: altered mental status, altered respiratory status, chewing difficulty, impaired cognition, swallow impairment, unable to feed self     Tests/Procedures: VFSS planned today w/ SLP       Anthropometrics        Current Height  Current Weight  BMI kg/m2 Height: 172 cm (67.72\")  Weight: 108 kg (237 lb 7 oz) (08/17/22 0401)  Body mass index is 36.4 kg/m².       Admission Weight Weight: 108 kg (237 lb 7 oz)   Ideal Body Weight (IBW) 67.7 kg (149 lb)   Usual Body Weight (UBW) 240 lb       Trending Weight Hx     Weight Change No change             PTA: Wt Readings from Last 30 Encounters:   08/17/22 0401 108 kg (237 lb 7 oz)   08/16/22 0600 110 kg (242 lb 8.1 oz)   08/16/22 0042 109 kg (240 lb 4.8 oz)   08/15/22 2245 109 kg (240 lb 4.8 oz)   07/30/17 0745 109 kg (240 lb)            Labs       Pertinent Labs    Results from " last 7 days   Lab Units 08/16/22  0128 08/15/22  1757   SODIUM mmol/L 144 141   POTASSIUM mmol/L 3.9 6.0*   CHLORIDE mmol/L 104 102   CO2 mmol/L 27.0 22.6   BUN mg/dL 28* 27*   CREATININE mg/dL 1.58* 1.80*   CALCIUM mg/dL 8.7 8.5   BILIRUBIN mg/dL 1.4* 1.3*   ALK PHOS U/L 116 128*   ALT (SGPT) U/L 149* 159*   AST (SGOT) U/L 217* 259*   GLUCOSE mg/dL 150* 158*     Results from last 7 days   Lab Units 08/16/22  1501   HEMOGLOBIN g/dL 9.3*   HEMATOCRIT % 28.4*   WBC 10*3/mm3 8.04     Results from last 7 days   Lab Units 08/16/22  1501 08/16/22  0128 08/15/22  2057 08/15/22  1757   INR   --   --  2.26*  --    PLATELETS 10*3/mm3 36* 41*  --  45*     COVID19   Date Value Ref Range Status   08/15/2022 Not Detected Not Detected - Ref. Range Final     No results found for: HGBA1C       Medications           Scheduled Medications atenolol, 12.5 mg, Oral, Daily  cholecalciferol, 1,000 Units, Oral, Daily  Divalproex Sodium, 125 mg, Oral, Daily  escitalopram, 10 mg, Oral, Daily  ferrous sulfate, 325 mg, Oral, Daily With Breakfast  furosemide, 20 mg, Oral, Daily  methylphenidate, 30 mg, Oral, BID  pantoprazole, 40 mg, Oral, QAM  piperacillin-tazobactam, 3.375 g, Intravenous, Q8H  potassium chloride, 20 mEq, Oral, Daily  protriptyline, 10 mg, Oral, BID  risperiDONE, 0.5 mg, Oral, BID  sodium chloride, 10 mL, Intravenous, Q12H  tamsulosin, 0.4 mg, Oral, Nightly       Infusions     PRN Medications •  acetaminophen  •  dextrose  •  dextrose  •  glucagon (human recombinant)  •  nitroglycerin  •  Insert peripheral IV **AND** sodium chloride  •  sodium chloride     Physical Findings        Physical Appearance confused, disoriented, on oxygen therapy, somnolent     NFPE Not applicable   --  Edema  no edema   Gastrointestinal hypoactive bowel sounds, last bowel movement:8/15   Tubes/Drains none   Oral/Mouth Cavity missing teeth   Skin skin intact   --  Current Nutrition Orders & Evaluation of Intake       Oral Nutrition     Food Allergies  NKFA   Current PO Diet NPO Diet NPO Type: Other; Other: c/o aspirating at nursing home. Awaiting SLP assessment/recommendation   Supplement n/a   PO Evaluation     Trending % PO Intake n/a       --  Nutrition Diagnosis        Nutrition Dx Problem 1 Problem: Swallowing Difficulty    Etiology: Functional Diagnosis and Factors Affecting Nutrition    Signs/Symptoms: SLP/Swallow Evaluation    Comment:      --  Intervention Goal        Intervention Goal(s) Maintain nutrition status, Reduce/improve symptoms, Meet estimated needs, Disease management/therapy and Advance diet     Nutrition Intervention        RD Action Follow Tx Progress and Care plan reviewed     Nutrition Prescription        Diet Prescription Diet per SLP recommendations   Supplement Prescription n/a   Prescription Ordered no   --  Monitor/Evaluation        Monitor Per protocol     RD to follow per protocol.      Electronically signed by:  Eli Cox RD  08/17/22 13:00 EDT

## 2022-08-17 NOTE — MBS/VFSS/FEES
Acute Care - Speech Language Pathology   Swallow Initial Evaluation [facility]     Patient Name: Alton Mayes  : 1931  MRN: [id number]  Today's Date: 2022               Admit Date: 8/15/2022    Visit Dx:     ICD-10-CM ICD-9-CM   1. Acute respiratory failure with hypoxia ([facility])  J96.01 518.81   2. Acute combined systolic and diastolic congestive heart failure ([facility])  I50.41 428.41     428.0   3. Aspiration pneumonia, unspecified aspiration pneumonia type, unspecified laterality, unspecified part of lung ([facility])  J69.0 507.0   4. Alzheimer's dementia with behavioral disturbance, unspecified timing of dementia onset ([facility])  G30.9 331.0    F02.81 294.11     Patient Active Problem List   Diagnosis   • Acute respiratory failure with hypoxia ([facility])   • CHF (congestive heart failure) ([facility])     Past Medical History:   Diagnosis Date   • Narcolepsy      Past Surgical History:   Procedure Laterality Date   • AORTA SURGERY     • KNEE SURGERY Bilateral        SLP Recommendation and Plan  SLP Swallowing Diagnosis: mild-moderate, oral dysphagia, pharyngeal dysphagia (22 103)  SLP Diet Recommendation: mechanical soft with no mixed consistencies, nectar thick liquids (22 103)  Recommended Precautions and Strategies: upright posture during/after eating, small bites of food and sips of liquid, alternate between small bites of food and sips of liquid, check mouth frequently for oral residue/pocketing, general aspiration precautions, reflux precautions, fatigue precautions, 1:1 supervision, assist with feeding (22)  SLP Rec. for Method of Medication Administration: meds crushed, with pudding or applesauce, as tolerated (22 103)     Monitor for Signs of Aspiration: yes, notify SLP if any concerns (22 103)  Recommended Diagnostics: reassess via clinical swallow evaluation (22)  Swallow Criteria for Skilled Therapeutic Interventions Met: demonstrates skilled criteria  (08/17/22 1030)  Anticipated Discharge Disposition (SLP): skilled nursing facility (08/17/22 1030)  Rehab Potential/Prognosis, Swallowing: good, to achieve stated therapy goals (08/17/22 1030)  Therapy Frequency (Swallow): PRN (08/17/22 1030)  Predicted Duration Therapy Intervention (Days): until discharge (08/17/22 1030)                                  Plan of Care Reviewed With: patient  Outcome Evaluation: VFSS completed. Recommend nectar thick by cup or straw / mechanical soft (no mixed), which is patient's diet at baseline. Medications crushed with puree. Precautions: upright, slow rate, small bites/sips, alternate solids with liquids, 1:1 assist/supervision, allow po when alert, reflux precautions. ST to follow for diet tolerance. If concern for aspiration increases, recommend NPO.      SWALLOW EVALUATION (last 72 hours)     SLP Adult Swallow Evaluation     Row Name 08/17/22 1030 08/16/22 0900                Rehab Evaluation    Document Type evaluation  -SR evaluation  -SH       Subjective Information no complaints  -SR --       Patient Observations alert;cooperative;agree to therapy  -SR --       Patient/Family/Caregiver Comments/Observations -- son, daughter, wife present  -SH       Patient Effort adequate  -SR fair  -SH       Symptoms Noted During/After Treatment none  -SR --                General Information    Patient Profile Reviewed yes  -SR yes  -SH       Pertinent History Of Current Problem 89 yom; resident at [facility] dementia unit. Staff at nursing home noticed mild SOB and persistent slowly worsening cough. CXR showed questionable pneumonia vs congestive heart failure.  -SR Suspected aspiration event, advanced dementia  -SH       Current Method of Nutrition NPO  -SR NPO  -SH       Precautions/Limitations, Vision WFL;for purposes of eval  -SR other (see comments)  eyes shut most of the assessment, suspect vision impairment  -SH       Precautions/Limitations, Hearing WFL;for purposes of  eval  -SR WFL;for purposes of eval  -SH       Prior Level of Function-Communication cognitive-linguistic impairment;other (see comments)  Dementia  -SR cognitive-linguistic impairment  -SH       Prior Level of Function-Swallowing mechanical soft textures;nectar thick liquids  -SR mechanical soft textures;nectar thick liquids  -SH       Plans/Goals Discussed with patient;agreed upon  -SR patient;agreed upon  -SH       Barriers to Rehab medically complex  -SR medically complex  -SH                Pain    Additional Documentation -- Pain Scale: FACES Pre/Post-Treatment (Group)  -SH                Pain Scale: Word Pre/Post-Treatment    Pain: Word Scale, Pretreatment 0 - no pain  -SR --       Posttreatment Pain Rating 0 - no pain  -SR --                Pain Scale: FACES Pre/Post-Treatment    Pain: FACES Scale, Pretreatment -- 0-->no hurt  -SH                Oral Motor Structure and Function    Dentition Assessment missing teeth  -SR --       Secretion Management WNL/WFL  -SR --       Mucosal Quality dry  -SR --       Volitional Swallow unable to elicit;other (see comments)  difficulty following commands  -SR --                Oral Musculature and Cranial Nerve Assessment    Oral Motor General Assessment generalized oral motor weakness  -SR --                General Eating/Swallowing Observations    Respiratory Support Currently in Use nasal cannula;other (see comments)  2L  -SR --       Eating/Swallowing Skills fed by SLP  -SR --       Positioning During Eating upright 90 degree  -SR --                Clinical Swallow Eval    Clinical Swallow Evaluation Summary -- Patient seen for clinical swallow assessment. Family present. Pt with advanced alzheimer's, difficulty following commands. Voice strong. Pt required alerting cues to participate, which is his current baseline per family. Pt is a total feed at baseline, on nectar and mech soft. No overt s/s of aspiration with multiple trials of puree. Strong cough with honey via  spoon 1/10 trials and immediate cough with honey via cup x1. SLP recs NPO. Critical meds crushed in puree. Will follow for VFSS next date, family agrees with recommendations. SLP introduced goals of care conversation, family reported patient does not want a feeding tube. Will await VFSS results before further GOC discussion.  -SH                MBS/VFSS    Utensils Used spoon;cup;straw  -SR --       Consistencies Trialed mechanical soft, no mixed consistencies;pureed;nectar/syrup-thick liquids;honey-thick liquids  -SR --                MBS/VFSS Interpretation    VFSS Summary VFSS completed with radiologist present, Dr. Reaves. Patient presents with moderate oropharyngeal dysphagia characterized by mistiming, decreased coordination, and poor bolus control. Demonstrated no penetration or aspiration with honey thick liquid by spoon and cup. Swallow initiated with honey thick bolus at the vallecula. Demonstrated prespill to the pyriforms for nectar thick liquid trials by spoon, cup, and straw. No penetration or aspiration with nectar thick liquid. Demonstrated adequate anterior-posterior transit time with puree; no penetration or aspiration. Mild vallecular residue partially cleared with nectar thick liquid wash via cup. Demonstrated difficulty following commands to complete dry swallow.  Prolonged mastication time with mechanical soft solids; mild oral residue. Oral residue cleared with liquid wash via cup. No penetration or aspiration with trials of honey thick and nectar thick liquid via cup at the end of the study. Coughing during study appeared unrelated to swallow function.  -SR --                SLP Communication to Radiology    Summary Statement VFSS completed with radiologist present, Dr. Reaves. Patient presents with moderate oropharyngeal dysphagia characterized by mistiming, decreased coordination, and poor bolus control. Demonstrated no penetration or aspiration with trials of honey thick liquid or  nectar thick liquid. Demonstrated adequate anterior-posterior transit time with puree; no penetration or aspiration. Mild vallecular residue partially cleared with nectar thick liquid wash via cup. Prolonged mastication time with mechanical soft solids; mild oral residue. Oral residue cleared with liquid wash via cup. Coughing during study appeared unrelated to swallow function.  -SR --                SLP Evaluation Clinical Impression    SLP Swallowing Diagnosis mild-moderate;oral dysphagia;pharyngeal dysphagia  -SR suspected pharyngeal dysphagia  -       Functional Impact risk of aspiration/pneumonia  -SR risk of aspiration/pneumonia  -       Rehab Potential/Prognosis, Swallowing good, to achieve stated therapy goals  -SR good, to achieve stated therapy goals  -       Swallow Criteria for Skilled Therapeutic Interventions Met demonstrates skilled criteria  -SR demonstrates skilled criteria  -                Recommendations    Therapy Frequency (Swallow) PRN  -SR PRN  -       Predicted Duration Therapy Intervention (Days) until discharge  -SR until discharge  -       SLP Diet Recommendation mechanical soft with no mixed consistencies;nectar thick liquids  -SR NPO  -       Recommended Diagnostics reassess via clinical swallow evaluation  -SR reassess via VFSS ([facility])  -       Recommended Precautions and Strategies upright posture during/after eating;small bites of food and sips of liquid;alternate between small bites of food and sips of liquid;check mouth frequently for oral residue/pocketing;general aspiration precautions;reflux precautions;fatigue precautions;1:1 supervision;assist with feeding  -SR upright posture during/after eating  -       Oral Care Recommendations Oral Care before breakfast, after meals and PRN  -SR Oral Care BID/PRN  -SH       SLP Rec. for Method of Medication Administration meds crushed;with pudding or applesauce;as tolerated  -SR meds crushed;with pudding or applesauce;as  tolerated;meds via alternate route  -       Monitor for Signs of Aspiration yes;notify SLP if any concerns  -SR yes;notify SLP if any concerns  -SH       Anticipated Discharge Disposition (SLP) skilled nursing facility  -SR unknown  -SH                Swallow Goals (SLP)    Swallow STGs -- diet tolerance goal selection (SLP)  -SH       Diet Tolerance Goal Selection (SLP) -- Patient will tolerate trials of  -SH                (STG) Patient will tolerate trials of    Consistencies Trialed (Tolerate trials) soft ground textures;nectar/ mildly thick liquids  no mixed consistencies  -SR pureed textures;honey/ moderately thick liquids  -SH       Desired Outcome (Tolerate trials) without signs/symptoms of aspiration  -SR without signs/symptoms of aspiration  -SH       [place] (Tolerate trials) with 1:1 assist/ supervision  -SR --       Time Frame (Tolerate trials) by discharge  -SR --             User Key  (r) = Recorded By, (t) = Taken By, (c) = Cosigned By    Initials Name Effective Dates     Caitie Milian MS CCC-SLP 06/16/21 -     SR Caitie Lowe SLP 01/12/22 -                 EDUCATION  The patient has been educated in the following areas:   Dysphagia (Swallowing Impairment) Oral Care/Hydration.        SLP GOALS     Row Name 08/17/22 1030 08/16/22 0900          (STG) Patient will tolerate trials of    Consistencies Trialed (Tolerate trials) soft ground textures;nectar/ mildly thick liquids  no mixed consistencies  -SR pureed textures;honey/ moderately thick liquids  -SH     Desired Outcome (Tolerate trials) without signs/symptoms of aspiration  -SR without signs/symptoms of aspiration  -SH     [place] (Tolerate trials) with 1:1 assist/ supervision  -SR --     Time Frame (Tolerate trials) by discharge  -SR --           User Key  (r) = Recorded By, (t) = Taken By, (c) = Cosigned By    Initials Name Provider Type    Caitie Finch MS CCC-SLP Speech and Language Pathologist    SR Caitie Lowe, JOSELUIS  Speech and Language Pathologist                   Time Calculation:    Time Calculation- SLP     Row Name 08/17/22 1524             Time Calculation- SLP    SLP Start Time 1030  -SR      SLP Received On 08/17/22  -SR              Untimed Charges    26113-WB Motion Fluoro Eval Swallow Minutes 105  -SR              Total Minutes    Untimed Charges Total Minutes 105  -SR       Total Minutes 105  -SR            User Key  (r) = Recorded By, (t) = Taken By, (c) = Cosigned By    Initials Name Provider Type    SR Caitie Lowe SLP Speech and Language Pathologist                Therapy Charges for Today     Code Description Service Date Service Provider Modifiers Qty    [id number]  ST MOTION FLUORO EVAL SWALLOW 7 8/17/2022 Caitie Lowe SLP GN 1               JOSELUIS Goldstein  8/17/2022

## 2022-08-17 NOTE — CONSULTS
[facility] Visit Report    Alton Mayes  [id number]  8/17/2022    [facility] consult received and records reviewed with [facility] medical officer. Patient is medically eligible for services at discharge. Spoke with patient's spouse Chloe and daughter Magaly at bedside to explain services and discuss goals of care. Family wishes are for patient to return to [facility] unit when appropriate-- with [facility] services to follow once patient has returned to the facility.     [facility] will continue to follow up for DC plan. Please update as needed and if care level needs change.    Thank you for allowing [facility] to participate with this patient's and family care needs. Please call with any questions or updates.    Georgia Bosch RN   [facility] Admissions Coordinator  [phone]

## 2022-08-18 VITALS
WEIGHT: 235.89 LBS | HEIGHT: 68 IN | OXYGEN SATURATION: 94 % | TEMPERATURE: 97.3 F | SYSTOLIC BLOOD PRESSURE: 92 MMHG | HEART RATE: 94 BPM | BODY MASS INDEX: 35.75 KG/M2 | DIASTOLIC BLOOD PRESSURE: 55 MMHG | RESPIRATION RATE: 18 BRPM

## 2022-08-18 PROBLEM — J96.01 ACUTE RESPIRATORY FAILURE WITH HYPOXIA: Status: RESOLVED | Noted: 2022-08-15 | Resolved: 2022-08-18

## 2022-08-18 PROBLEM — G30.1 ALZHEIMER'S DEMENTIA, LATE ONSET, WITH BEHAVIORAL DISTURBANCE: Status: ACTIVE | Noted: 2022-08-18

## 2022-08-18 PROBLEM — F02.818 ALZHEIMER'S DEMENTIA, LATE ONSET, WITH BEHAVIORAL DISTURBANCE (HCC): Status: ACTIVE | Noted: 2022-08-18

## 2022-08-18 PROBLEM — R13.12 OROPHARYNGEAL DYSPHAGIA: Status: ACTIVE | Noted: 2022-08-18

## 2022-08-18 PROBLEM — D50.9 IRON DEFICIENCY ANEMIA: Status: RESOLVED | Noted: 2022-08-18 | Resolved: 2022-08-18

## 2022-08-18 PROBLEM — D50.9 IRON DEFICIENCY ANEMIA: Status: ACTIVE | Noted: 2022-08-18

## 2022-08-18 LAB
ALBUMIN SERPL-MCNC: 3 G/DL (ref 3.5–5.2)
ALBUMIN/GLOB SERPL: 0.9 G/DL
ALP SERPL-CCNC: 98 U/L (ref 39–117)
ALT SERPL W P-5'-P-CCNC: 98 U/L (ref 1–41)
ANION GAP SERPL CALCULATED.3IONS-SCNC: 10 MMOL/L (ref 5–15)
AST SERPL-CCNC: 60 U/L (ref 1–40)
BACTERIA SPEC AEROBE CULT: ABNORMAL
BILIRUB SERPL-MCNC: 1.4 MG/DL (ref 0–1.2)
BUN SERPL-MCNC: 24 MG/DL (ref 8–23)
BUN/CREAT SERPL: 22.2 (ref 7–25)
CALCIUM SPEC-SCNC: 8.7 MG/DL (ref 8.2–9.6)
CHLORIDE SERPL-SCNC: 111 MMOL/L (ref 98–107)
CO2 SERPL-SCNC: 26 MMOL/L (ref 22–29)
CREAT SERPL-MCNC: 1.08 MG/DL (ref 0.76–1.27)
DEPRECATED RDW RBC AUTO: 67.6 FL (ref 37–54)
EGFRCR SERPLBLD CKD-EPI 2021: 64.8 ML/MIN/1.73
ERYTHROCYTE [DISTWIDTH] IN BLOOD BY AUTOMATED COUNT: 20.6 % (ref 12.3–15.4)
GLOBULIN UR ELPH-MCNC: 3.3 GM/DL
GLUCOSE SERPL-MCNC: 122 MG/DL (ref 65–99)
GRAM STN SPEC: ABNORMAL
HCT VFR BLD AUTO: 29 % (ref 37.5–51)
HGB BLD-MCNC: 9.4 G/DL (ref 13–17.7)
ISOLATED FROM: ABNORMAL
MCH RBC QN AUTO: 29.7 PG (ref 26.6–33)
MCHC RBC AUTO-ENTMCNC: 32.4 G/DL (ref 31.5–35.7)
MCV RBC AUTO: 91.5 FL (ref 79–97)
PLATELET # BLD AUTO: 32 10*3/MM3 (ref 140–450)
POTASSIUM SERPL-SCNC: 3.5 MMOL/L (ref 3.5–5.2)
PROT SERPL-MCNC: 6.3 G/DL (ref 6–8.5)
RBC # BLD AUTO: 3.17 10*6/MM3 (ref 4.14–5.8)
SODIUM SERPL-SCNC: 147 MMOL/L (ref 136–145)
WBC NRBC COR # BLD: 5.78 10*3/MM3 (ref 3.4–10.8)

## 2022-08-18 PROCEDURE — 80053 COMPREHEN METABOLIC PANEL: CPT | Performed by: INTERNAL MEDICINE

## 2022-08-18 PROCEDURE — 85027 COMPLETE CBC AUTOMATED: CPT | Performed by: INTERNAL MEDICINE

## 2022-08-18 PROCEDURE — 25010000002 PIPERACILLIN SOD-TAZOBACTAM PER 1 G: Performed by: INTERNAL MEDICINE

## 2022-08-18 RX ORDER — FERROUS SULFATE 325(65) MG
325 TABLET ORAL
Qty: 30 TABLET | Refills: 6
Start: 2022-08-19

## 2022-08-18 RX ADMIN — FERROUS SULFATE TAB 325 MG (65 MG ELEMENTAL FE) 325 MG: 325 (65 FE) TAB at 08:18

## 2022-08-18 RX ADMIN — PROTRIPTYLINE HYDROCHLORIDE 10 MG: 10 TABLET ORAL at 08:17

## 2022-08-18 RX ADMIN — RISPERIDONE 0.5 MG: 0.5 TABLET, FILM COATED ORAL at 08:17

## 2022-08-18 RX ADMIN — Medication 10 ML: at 08:22

## 2022-08-18 RX ADMIN — FUROSEMIDE 20 MG: 20 TABLET ORAL at 08:19

## 2022-08-18 RX ADMIN — DIVALPROEX SODIUM 125 MG: 125 CAPSULE, COATED PELLETS ORAL at 08:21

## 2022-08-18 RX ADMIN — POTASSIUM CHLORIDE 20 MEQ: 750 TABLET, EXTENDED RELEASE ORAL at 08:18

## 2022-08-18 RX ADMIN — ESCITALOPRAM 10 MG: 10 TABLET, FILM COATED ORAL at 08:18

## 2022-08-18 RX ADMIN — Medication 1000 UNITS: at 08:18

## 2022-08-18 RX ADMIN — PANTOPRAZOLE SODIUM 40 MG: 40 TABLET, DELAYED RELEASE ORAL at 08:18

## 2022-08-18 RX ADMIN — TAZOBACTAM SODIUM AND PIPERACILLIN SODIUM 3.38 G: 375; 3 INJECTION, SOLUTION INTRAVENOUS at 08:19

## 2022-08-18 RX ADMIN — RISPERIDONE 0.5 MG: 0.5 TABLET ORAL at 12:07

## 2022-08-18 RX ADMIN — TAZOBACTAM SODIUM AND PIPERACILLIN SODIUM 3.38 G: 375; 3 INJECTION, SOLUTION INTRAVENOUS at 00:05

## 2022-08-18 RX ADMIN — ACETAMINOPHEN 500 MG: 500 TABLET ORAL at 12:07

## 2022-08-18 RX ADMIN — METHYLPHENIDATE HYDROCHLORIDE 30 MG: 10 TABLET ORAL at 00:05

## 2022-08-18 NOTE — CASE MANAGEMENT/SOCIAL WORK
"Physicians Statement of Medical Necessity for  Ambulance Transportation    GENERAL INFORMATION     Name: Alton Mayes  YOB: 1931  Medicare #: [id number]  Transport Date: 8/18/2022 (Valid for round trips this date, or for scheduled repetitive trips for 60 days from the date signed below.)  Origin: [facility]  Destination: [facility].  Is the Patient's stay covered under Medicare Part A (PPS/DRG?)Yes  Closest appropriate facility? Yes  If this a hosp-hosp transfer? No  Is this a hospice patient? Yes, Is this transport related to patient's terminal illness? Yes    MEDICAL NECESSITY QUESTIONAIRE    Ambulance Transportation is medically necessary only if other means of transportation are contraindicated or would be potentially harmful to the patient.  To meet this requirement, the patient must be either \"bed confined\" or suffer from a condition such that transport by means other than an ambulance is contraindicated by the patient's condition.  The following questions must be answered by the healthcare professional signing below for this form to be valid:     1) Describe the MEDICAL CONDITION (physical and/or mental) of this patient AT THE TIME OF AMBULANCE TRANSPORT that requires the patient to be transported in an ambulance, and why transport by other means is contraindicated by the patient's condition: Immobility, confusion.   Past Medical History:   Diagnosis Date   • Narcolepsy       Past Surgical History:   Procedure Laterality Date   • AORTA SURGERY  2001   • KNEE SURGERY Bilateral       2) Is this patient \"bed confined\" as defined below?Yes   To be \"bed confined\" the patient must satisfy all three of the following criteria:  (1) unable to get up from bed without assistance; AND (2) unable to ambulate;  AND (3) unable to sit in a chair or wheelchair.  3) Can this patient safely be transported by car or wheelchair van (I.e., may safely sit during transport, without an attendant or monitoring?)No "   4. In addition to completing questions 1-3 above, please check any of the following conditions that apply*:          *Note: supporting documentation for any boxes checked must be maintained in the patient's medical records Patient is confused and Unable to tolerate seated position for time needed to transport      SIGNATURE OF PHYSICIAN OR OTHER AUTHORIZED HEALTHCARE PROFESSIONAL    I certify that the above information is true and correct based on my evaluation of this patient, and represent that the patient requires transport by ambulance and that other forms of transport are contraindicated.  I understand that this information will be used by the Centers for Medicare and Medicaid Services (CMS) to support the determiniation of medical necessity for ambulance services, and I represent that I have personal knowledge of the patient's condition at the time of transport.    x   If this box is checked, I also certify that the patient is physically or mentally incapable of signing the ambulance service's claim form and that the institution with which I am affiliated has furnished care, services or assistance to the patient.  My signature below is made on behalf of the patient pursuant to 42 .36(b)(4). In accordance with 42 .37, the specific reason(s) that the patient is physically or mentally incapable of signing the claim for is as follows: confusion    Signature of Physician or Healthcare Professional  Date/Time:   8/18/2022     (For Scheduled repetitive transport, this form is not valid for transports performed more than 60 days after this date).       Caitie Maxwell RN, BSN                                                                                                                            --------------------------------------------------------------------------------------------  Printed Name and Credentials of Physician or Authorized Healthcare Professional     *Form must be signed by  patient's attending physician for scheduled, repetitive transports,.  For non-repetitive ambulance transports, if unable to obtain the signature of the attending physician, any of the following may sign (please select below):     Physician  Clinical Nurse Specialist  Registered Nurse     Physician Assistant x Discharge Planner  Licensed Practical Nurse     Nurse Practitioner x

## 2022-08-18 NOTE — PLAN OF CARE
Goal Outcome Evaluation:  Plan of Care Reviewed With: patient      Assisted to turn and reposition. Incontinent of urine. Tolerating thickened liquids. Occasional productive cough tannish mucous. Oxygen at 2 L n/c.

## 2022-08-18 NOTE — DISCHARGE SUMMARY
DISCHARGE SUMMARY:  Date of admit: 8/15/22  Date of discharge: 8/18/22    Hospital Course  Pt transferred from [facility] 8/15/22 for increased SOA and cough. Had mild cough for 2 weeks, but acute worsening after presumed aspiration following lunch on day of admit. O2 sats low on 10 liter NRM, and very lethargic. In ER, CXR with bilateral airspace disease possibly effusions vs pneumonia. Started on empiric IV Zosyn and IV Lasix. Initial WBC normal but elevated lactase. Hgb was low at 7.7 with no obvious source of bleeding. He was admitted for further care.    On admit, repeat Hgb was further decreased to 7.1 and patient was transfused with 2 units PRBC. He was clinically stable the entire admit. BP was well controlled and he was afebrile. His mental staus improved daily. After 48 hours he had returned to baseline (end stage dementia) and was sleeping/resting quietly most of the time, would awaken to voice or stimulus and became conversant. Denied pain. As his mental status improved, he did have some mild restlessness, which (as in the nursing home) responded to low [place] Risperdol.    Blood cultures grew out coag (-) staph (? Strain), which was ultimately felt to be contaminate. Given his normal clear lung exam, lactate had normalized, and WBC remained normal, it was felt the infiltrates were likely due to effusions/CHF, and not pneumonia. Zosyn was dc'd and he was not started on p.o. antibiotics.    Patient has been DNR for years due to declining health and end-stage dementia. Over the last few months he has become essentially total care, no longer able to ambulate, markedly decreased verbally, and more confused,  And restless/agitated at times and at [place]. Family had discussed his overall prognosis, and felt (which I strongly agree with) that withdrawing meds, and pursuing comfort care at [place] with Hospice support was best course for patients care.    On day of discharge. He remained comfortable,  sleeping soundly most of the day, but awakening easily and responsive. Swallow studies confirmed moderate aspiration with thin liquids. He was placed kept on mechanical soft diet with nectar thick liquids, which he tolerated. He had no further episodes of aspiration during his stay.    Discharge plans:  1) discharge back to [facility] for palliative/ comfort care  2) Hospice to follow  3) DNR-comfort care only  4) mechanical soft diet with nectar thick liquids  5) meds: (see hospital discharge list)

## 2022-08-18 NOTE — CASE MANAGEMENT/SOCIAL WORK
Continued Stay Note  [facility]     Patient Name: Alton Mayes  MRN: [id number]  Today's Date: 8/18/2022    Admit Date: 8/15/2022     Discharge Plan     Row Name 08/18/22 1400       Plan    Plan Home to [facility] with [facility]    Patient/Family in Agreement with Plan yes    Plan Comments Spoke with Torri/[facility] who stated family needs to be present at bedside at 1030 to sign paperwork for pt to return home with [facility]. Unsure if will need equipment delivered. Scheduled [facility] for 1500. Spoke with Jose Alfredo pt son who stated family would be there at 1030. EMS DNR needed, notified Lupe [place] will asst., EMS DNR in packet, d/c summary faxed, family will meet pt at facility. Pkt to MICHELLE. -Caitie DARLING               Discharge Codes    No documentation.               Expected Discharge Date and Time     Expected Discharge Date Expected Discharge Time    Aug 18, 2022             Caitie Maxwell RN

## 2022-08-18 NOTE — DISCHARGE PLACEMENT REQUEST
"Brandon Mayes (89 y.o. Male)             Date of Birth   08/09/1931    Social Security Number       Address   [address] [place] [address]    Home Phone   [phone]    MRN   [id number]       Anglican   Zoroastrian    Marital Status                               Admission Date   8/15/22    Admission Type   Emergency    Admitting Provider   Jin Cain MD    Attending Provider   Jin Cain MD    Department, Room/Bed   [address], [address]29/1       Discharge Date       Discharge Disposition   Long Term Care (DC - External)    Discharge Destination                               Attending Provider: Jin Cain MD    Allergies: Streptomycin    Isolation: None   Infection: None   Code Status: No CPR   Advance Care Planning Activity    Ht: 172 cm (67.72\")   Wt: 107 kg (235 lb 14.3 oz)    Admission Cmt: None   Principal Problem: None                Active Insurance as of 8/15/2022     Primary Coverage     Payor Plan Insurance Group Employer/Plan Group    [facility] MEDICARE REPLACEMENT [employer] MEDICARE REPLACEMENT [id number]     Payor Plan Address Payor Plan Phone Number Payor Plan Fax Number Effective Dates    [facility] [phone]  1/1/2022 - None Entered    [address]       [facility] [id number]       Subscriber Name Subscriber Birth Date Member ID       BRANDON MAYES 8/9/1931 [id number]                 Emergency Contacts      (Rel.) Home Phone Work Phone Mobile Phone    Magaly Hall (Daughter) [phone] -- [phone]    Zain Mayes (Son) -- -- [phone]    Chloe Mayes (Spouse) [phone] -- --               Discharge Summary      Jin Cain MD at 08/18/22 0750     Summary:discharge             DISCHARGE SUMMARY:  Date of admit: 8/15/22  Date of discharge: 8/18/22    Hospital Course  Pt transferred from [facility] 8/15/22 for increased SOA " and cough. Had mild cough for 2 weeks, but acute worsening after presumed aspiration following lunch on day of admit. O2 sats low on 10 liter NRM, and very lethargic. In ER, CXR with bilateral airspace disease possibly effusions vs pneumonia. Started on empiric IV Zosyn and IV Lasix. Initial WBC normal but elevated lactase. Hgb was low at 7.7 with no obvious source of bleeding. He was admitted for further care.    On admit, repeat Hgb was further decreased to 7.1 and patient was transfused with 2 units PRBC. He was clinically stable the entire admit. BP was well controlled and he was afebrile. His mental staus improved daily. After 48 hours he had returned to baseline (end stage dementia) and was sleeping/resting quietly most of the time, would awaken to voice or stimulus and became conversant. Denied pain. As his mental status improved, he did have some mild restlessness, which (as in the nursing home) responded to low blanton Risperdol.    Blood cultures grew out coag (-) staph (? Strain), which was ultimately felt to be contaminate. Given his normal clear lung exam, lactate had normalized, and WBC remained normal, it was felt the infiltrates were likely due to effusions/CHF, and not pneumonia. Zosyn was dc'd and he was not started on p.o. antibiotics.    Patient has been DNR for years due to declining health and end-stage dementia. Over the last few months he has become essentially total care, no longer able to ambulate, markedly decreased verbally, and more confused,  And restless/agitated at times and at [place]. Family had discussed his overall prognosis, and felt (which I strongly agree with) that withdrawing meds, and pursuing comfort care at [place] with Hospice support was best course for patients care.    On day of discharge. He remained comfortable, sleeping soundly most of the day, but awakening easily and responsive. Swallow studies confirmed moderate aspiration with thin liquids. He was placed kept on  mechanical soft diet with nectar thick liquids, which he tolerated. He had no further episodes of aspiration during his stay.    Discharge plans:  1) discharge back to [facility] for palliative/ comfort care  2) Hospice to follow  3) DNR-comfort care only  4) mechanical soft diet with nectar thick liquids  5) meds: (see hospital discharge list)        Electronically signed by Jin Cain MD at 08/18/22 08

## 2022-08-19 NOTE — CASE MANAGEMENT/SOCIAL WORK
Case Management Discharge Note      Final Note: Home with hospice via [facility]         Selected Continued Care - Discharged on 8/18/2022 Admission date: 8/15/2022 - Discharge disposition: Long Term Care (DC - External)    Destination    No services have been selected for the patient.              Durable Medical Equipment    No services have been selected for the patient.              Dialysis/Infusion    No services have been selected for the patient.              Home Medical Care    No services have been selected for the patient.              Therapy    No services have been selected for the patient.              Community Resources    No services have been selected for the patient.              Community & DME    No services have been selected for the patient.                  Transportation Services  Ambulance: [facility]    Final Discharge Disposition Code: 50 - home with hospice   yes

## 2022-08-20 LAB — BACTERIA SPEC AEROBE CULT: NORMAL

## 2022-08-27 NOTE — PROGRESS NOTES
This is a post discharge note clarifying patient medical stay. His primary reason for admission was:     Problem, list:     1) Acute hypoxic respiratory failure secondary to aspiration  2) Due to aspiration  3) aspiration due to end stage dementia with global pharyngeal phase dysphagia